# Patient Record
Sex: FEMALE | Race: WHITE | NOT HISPANIC OR LATINO | Employment: PART TIME | URBAN - METROPOLITAN AREA
[De-identification: names, ages, dates, MRNs, and addresses within clinical notes are randomized per-mention and may not be internally consistent; named-entity substitution may affect disease eponyms.]

---

## 2017-02-28 ENCOUNTER — PATIENT MESSAGE (OUTPATIENT)
Dept: FAMILY MEDICINE | Facility: CLINIC | Age: 67
End: 2017-02-28

## 2017-03-07 ENCOUNTER — TELEPHONE (OUTPATIENT)
Dept: FAMILY MEDICINE | Facility: CLINIC | Age: 67
End: 2017-03-07

## 2017-03-07 DIAGNOSIS — R73.9 HYPERGLYCEMIA: Primary | ICD-10-CM

## 2017-03-08 NOTE — TELEPHONE ENCOUNTER
I have signed for the following orders AND/OR meds.  Please call the patient and ask the patient to schedule the testing AND/OR inform about any medications that were sent.      Orders Placed This Encounter   Procedures    Glucose Tolerance 2 Hour     Standing Status:   Future     Standing Expiration Date:   5/6/2018    Hemoglobin A1c     Standing Status:   Future     Standing Expiration Date:   5/6/2018

## 2017-04-26 RX ORDER — CLOPIDOGREL BISULFATE 75 MG/1
TABLET ORAL
Qty: 90 TABLET | Refills: 1 | Status: SHIPPED | OUTPATIENT
Start: 2017-04-26 | End: 2018-03-07

## 2017-04-26 RX ORDER — CLOPIDOGREL BISULFATE 75 MG/1
TABLET ORAL
Qty: 90 TABLET | Refills: 3 | Status: SHIPPED | OUTPATIENT
Start: 2017-04-26 | End: 2017-04-26 | Stop reason: SDUPTHER

## 2017-04-26 RX ORDER — LEVOTHYROXINE SODIUM 75 UG/1
TABLET ORAL
Qty: 90 TABLET | Refills: 1 | Status: SHIPPED | OUTPATIENT
Start: 2017-04-26 | End: 2017-08-14 | Stop reason: SDUPTHER

## 2017-04-26 RX ORDER — CARVEDILOL 3.12 MG/1
TABLET ORAL
Qty: 180 TABLET | Refills: 3 | Status: SHIPPED | OUTPATIENT
Start: 2017-04-26 | End: 2017-07-31 | Stop reason: SDUPTHER

## 2017-04-26 RX ORDER — LEVOTHYROXINE SODIUM 75 UG/1
TABLET ORAL
Qty: 90 TABLET | Refills: 3 | Status: SHIPPED | OUTPATIENT
Start: 2017-04-26 | End: 2017-04-26 | Stop reason: SDUPTHER

## 2017-04-26 NOTE — TELEPHONE ENCOUNTER
Health Maintenance Due   Topic Date Due    TETANUS VACCINE  07/11/1968    Mammogram  01/02/2016    Pneumococcal (65+) (2 of 2 - PCV13) 03/23/2016    Influenza Vaccine  08/01/2016     Book a mammogram and a prevnar 13.

## 2017-05-13 ENCOUNTER — PATIENT MESSAGE (OUTPATIENT)
Dept: FAMILY MEDICINE | Facility: CLINIC | Age: 67
End: 2017-05-13

## 2017-05-15 RX ORDER — ZOLPIDEM TARTRATE 5 MG/1
5 TABLET ORAL NIGHTLY
Qty: 90 TABLET | Refills: 1 | Status: SHIPPED | OUTPATIENT
Start: 2017-05-15 | End: 2017-08-14 | Stop reason: SDUPTHER

## 2017-05-15 RX ORDER — TIZANIDINE 4 MG/1
4 TABLET ORAL EVERY 6 HOURS PRN
Qty: 90 TABLET | Refills: 2 | Status: SHIPPED | OUTPATIENT
Start: 2017-05-15 | End: 2017-08-14

## 2017-06-15 RX ORDER — ATORVASTATIN CALCIUM 40 MG/1
20 TABLET, FILM COATED ORAL DAILY
Qty: 15 TABLET | Refills: 0 | Status: SHIPPED | OUTPATIENT
Start: 2017-06-15 | End: 2018-01-22

## 2017-06-15 RX ORDER — FLUTICASONE PROPIONATE 50 MCG
2 SPRAY, SUSPENSION (ML) NASAL DAILY
Qty: 16 G | Refills: 0 | Status: SHIPPED | OUTPATIENT
Start: 2017-06-15 | End: 2018-03-07

## 2017-06-15 RX ORDER — SERTRALINE HYDROCHLORIDE 50 MG/1
50 TABLET, FILM COATED ORAL DAILY
Qty: 90 TABLET | Refills: 0 | Status: SHIPPED | OUTPATIENT
Start: 2017-06-15 | End: 2018-02-27 | Stop reason: SDUPTHER

## 2017-06-15 NOTE — TELEPHONE ENCOUNTER
I have refilled the patient's requested medication x 1 month.  However, the patient is due for an evaluation in the office.  Call the patient on the phone and book the patient with EITHER ME OR LINH OROZCO NP for a visit.    PLEASE DOCUMENT THE FACT THAT YOU HAVE CONTACTED THE PATIENT IN THE CHART FOR FUTURE REFERENCE.    Health Maintenance Due   Topic Date Due    TETANUS VACCINE  07/11/1968    Mammogram  01/05/2016    Pneumococcal (65+) (2 of 2 - PCV13) 03/23/2016

## 2017-06-19 ENCOUNTER — PATIENT MESSAGE (OUTPATIENT)
Dept: FAMILY MEDICINE | Facility: CLINIC | Age: 67
End: 2017-06-19

## 2017-06-26 ENCOUNTER — LAB VISIT (OUTPATIENT)
Dept: LAB | Facility: HOSPITAL | Age: 67
End: 2017-06-26
Attending: FAMILY MEDICINE
Payer: MEDICARE

## 2017-06-26 DIAGNOSIS — R73.9 HYPERGLYCEMIA: ICD-10-CM

## 2017-06-26 LAB
GLUCOSE SERPL-MCNC: 127 MG/DL
GLUCOSE SERPL-MCNC: 153 MG/DL
GLUCOSE SERPL-MCNC: 229 MG/DL

## 2017-06-26 PROCEDURE — 82951 GLUCOSE TOLERANCE TEST (GTT): CPT

## 2017-06-27 ENCOUNTER — TELEPHONE (OUTPATIENT)
Dept: FAMILY MEDICINE | Facility: CLINIC | Age: 67
End: 2017-06-27

## 2017-06-27 DIAGNOSIS — E11.9 TYPE 2 DIABETES MELLITUS WITHOUT COMPLICATION, UNSPECIFIED LONG TERM INSULIN USE STATUS: Primary | ICD-10-CM

## 2017-06-27 NOTE — TELEPHONE ENCOUNTER
----- Message from Mayda Villa NP sent at 6/26/2017  4:37 PM CDT -----  Hgb A1C acceptable. Continue current medications. Repeat in 6 m.

## 2017-07-01 ENCOUNTER — PATIENT MESSAGE (OUTPATIENT)
Dept: FAMILY MEDICINE | Facility: CLINIC | Age: 67
End: 2017-07-01

## 2017-07-03 RX ORDER — PANTOPRAZOLE SODIUM 40 MG/1
40 TABLET, DELAYED RELEASE ORAL DAILY
Qty: 30 TABLET | Refills: 0 | Status: SHIPPED | OUTPATIENT
Start: 2017-07-03 | End: 2017-07-29 | Stop reason: SDUPTHER

## 2017-07-09 PROBLEM — R73.9 HYPERGLYCEMIA: Status: ACTIVE | Noted: 2017-07-09

## 2017-07-09 NOTE — PROGRESS NOTES
The glucose is high fasting but the 2 hour glucose and the a1c are both in good control.  I would not call this diabetes at this point however, this is technically hyperglycemia and if nothing is done on weight loss and exercise, this usually worsens over time.  Please use a low glucose and weight loss diet and increase exercise to help lose weight.  Decrease caloric intake by at least 500 calories a day to average a weight loss of 1 # a week and recheck the fasting glucose and a1c in 6 months.

## 2017-07-31 RX ORDER — PANTOPRAZOLE SODIUM 40 MG/1
40 TABLET, DELAYED RELEASE ORAL DAILY
Qty: 30 TABLET | Refills: 11 | OUTPATIENT
Start: 2017-07-31 | End: 2017-08-02 | Stop reason: SDUPTHER

## 2017-08-02 ENCOUNTER — PATIENT MESSAGE (OUTPATIENT)
Dept: FAMILY MEDICINE | Facility: CLINIC | Age: 67
End: 2017-08-02

## 2017-08-02 RX ORDER — PANTOPRAZOLE SODIUM 40 MG/1
40 TABLET, DELAYED RELEASE ORAL DAILY
Qty: 30 TABLET | Refills: 1 | Status: SHIPPED | OUTPATIENT
Start: 2017-08-02 | End: 2017-12-13

## 2017-08-02 RX ORDER — CARVEDILOL 3.12 MG/1
3.12 TABLET ORAL 2 TIMES DAILY WITH MEALS
Qty: 180 TABLET | Refills: 3 | Status: SHIPPED | OUTPATIENT
Start: 2017-08-02 | End: 2018-02-27 | Stop reason: SDUPTHER

## 2017-08-02 RX ORDER — ERGOCALCIFEROL 1.25 MG/1
50000 CAPSULE ORAL
Qty: 24 CAPSULE | Refills: 3 | Status: SHIPPED | OUTPATIENT
Start: 2017-08-03 | End: 2018-02-27 | Stop reason: SDUPTHER

## 2017-08-02 NOTE — TELEPHONE ENCOUNTER
This patient requested medication refill and has not been seen by their PCP. Please schedule appointment with PCP.

## 2017-08-14 ENCOUNTER — OFFICE VISIT (OUTPATIENT)
Dept: FAMILY MEDICINE | Facility: CLINIC | Age: 67
End: 2017-08-14
Payer: MEDICARE

## 2017-08-14 VITALS
TEMPERATURE: 98 F | DIASTOLIC BLOOD PRESSURE: 63 MMHG | HEART RATE: 73 BPM | HEIGHT: 62 IN | SYSTOLIC BLOOD PRESSURE: 112 MMHG | WEIGHT: 183.63 LBS | BODY MASS INDEX: 33.79 KG/M2

## 2017-08-14 DIAGNOSIS — R39.15 URINARY URGENCY: ICD-10-CM

## 2017-08-14 DIAGNOSIS — F51.01 PRIMARY INSOMNIA: ICD-10-CM

## 2017-08-14 DIAGNOSIS — K21.9 GASTROESOPHAGEAL REFLUX DISEASE, ESOPHAGITIS PRESENCE NOT SPECIFIED: Primary | ICD-10-CM

## 2017-08-14 DIAGNOSIS — E11.9 TYPE 2 DIABETES MELLITUS WITHOUT COMPLICATION, UNSPECIFIED LONG TERM INSULIN USE STATUS: ICD-10-CM

## 2017-08-14 DIAGNOSIS — E03.9 HYPOTHYROIDISM, UNSPECIFIED TYPE: Chronic | ICD-10-CM

## 2017-08-14 LAB
BILIRUB UR QL STRIP: NEGATIVE
CLARITY UR: CLEAR
COLOR UR: YELLOW
GLUCOSE UR QL STRIP: NEGATIVE
HGB UR QL STRIP: ABNORMAL
KETONES UR QL STRIP: NEGATIVE
LEUKOCYTE ESTERASE UR QL STRIP: NEGATIVE
NITRITE UR QL STRIP: NEGATIVE
PH UR STRIP: 6 [PH] (ref 5–8)
PROT UR QL STRIP: NEGATIVE
SP GR UR STRIP: 1.02 (ref 1–1.03)
URN SPEC COLLECT METH UR: ABNORMAL

## 2017-08-14 PROCEDURE — 99999 PR PBB SHADOW E&M-EST. PATIENT-LVL III: CPT | Mod: PBBFAC,,, | Performed by: FAMILY MEDICINE

## 2017-08-14 PROCEDURE — 81002 URINALYSIS NONAUTO W/O SCOPE: CPT | Mod: PO

## 2017-08-14 PROCEDURE — 99213 OFFICE O/P EST LOW 20 MIN: CPT | Mod: PBBFAC,PO | Performed by: FAMILY MEDICINE

## 2017-08-14 PROCEDURE — 3044F HG A1C LEVEL LT 7.0%: CPT | Mod: ,,, | Performed by: FAMILY MEDICINE

## 2017-08-14 PROCEDURE — 3078F DIAST BP <80 MM HG: CPT | Mod: ,,, | Performed by: FAMILY MEDICINE

## 2017-08-14 PROCEDURE — 3074F SYST BP LT 130 MM HG: CPT | Mod: ,,, | Performed by: FAMILY MEDICINE

## 2017-08-14 PROCEDURE — 99214 OFFICE O/P EST MOD 30 MIN: CPT | Mod: S$PBB,,, | Performed by: FAMILY MEDICINE

## 2017-08-14 PROCEDURE — 1126F AMNT PAIN NOTED NONE PRSNT: CPT | Mod: ,,, | Performed by: FAMILY MEDICINE

## 2017-08-14 PROCEDURE — 1159F MED LIST DOCD IN RCRD: CPT | Mod: ,,, | Performed by: FAMILY MEDICINE

## 2017-08-14 RX ORDER — TIZANIDINE 4 MG/1
4 TABLET ORAL NIGHTLY PRN
COMMUNITY
End: 2018-05-14 | Stop reason: SDUPTHER

## 2017-08-14 RX ORDER — SUCRALFATE 1 G/1
1 TABLET ORAL 4 TIMES DAILY
Qty: 360 TABLET | Refills: 3 | Status: SHIPPED | OUTPATIENT
Start: 2017-08-14 | End: 2017-12-13

## 2017-08-14 RX ORDER — ZOLPIDEM TARTRATE 5 MG/1
5 TABLET ORAL NIGHTLY
Qty: 90 TABLET | Refills: 1 | Status: SHIPPED | OUTPATIENT
Start: 2017-08-14 | End: 2018-01-22 | Stop reason: SDUPTHER

## 2017-08-14 RX ORDER — LEVOTHYROXINE SODIUM 75 UG/1
TABLET ORAL
Qty: 90 TABLET | Refills: 3 | Status: SHIPPED | OUTPATIENT
Start: 2017-08-14 | End: 2018-02-27 | Stop reason: SDUPTHER

## 2017-08-14 RX ORDER — HYDROGEN PEROXIDE 3 %
20 SOLUTION, NON-ORAL MISCELLANEOUS
COMMUNITY
End: 2017-08-14

## 2017-08-14 RX ORDER — ACETAMINOPHEN 500 MG
1 TABLET ORAL NIGHTLY
COMMUNITY

## 2017-08-14 NOTE — PROGRESS NOTES
Subjective:      Patient ID: Severiano Downing is a 67 y.o. female.    Chief Complaint: Annual Exam    HPI   The patient presents with hypothyroidism.  The patient denies agitation, anxiety, blurred vision, chest pain, cold intolerance, constipation, dizziness, dry skin, fatigue, lightheadedness, paresthesias, skin coarsening, tachycardia, tremor, weight gain or weight loss.  The patient's current treatment has included Synthroid with a good response.    Lab Results   Component Value Date    TSH 3.052 06/26/2017     She has had GERD and she has had an EGD in [facility] a few years ago. She requires bid dose of protonix and she has been on qd for a while and she is having to dose with nexium to help this.  She has had carafate in the past and she is willing to get on this instead of bid dose of a PPI and she would like to see a GI to consider surgical correction of this problem.       She has had discomfort urinating and she has had a decreased urination and she has had urinary urgency.       She has chronic insomnia and her medication helps with this.  It is ambien.  She is needing a refill and we will continue this.     The patient presents with diabetes.  The patient denies polyuria, polydipsia, polyphagia, hypoglycemia and paresthesias.  The patient's glucose control has been good.  Home glucose averages are routinely checked.  The patient is without retinopathy currently.  The patient has no history of neuropathy.  The patient currently complains of no podiatric problems.  The patient has excellent compliance.  Hemoglobin A1C   Date Value Ref Range Status   06/26/2017 6.0 (H) 4.0 - 5.6 % Final     Comment:     According to ADA guidelines, hemoglobin A1c <7.0% represents  optimal control in non-pregnant diabetic patients. Different  metrics may apply to specific patient populations.   Standards of Medical Care in Diabetes-2016.  For the purpose of screening for the presence of diabetes:  <5.7%     Consistent with  the absence of diabetes  5.7-6.4%  Consistent with increasing risk for diabetes   (prediabetes)  >or=6.5%  Consistent with diabetes  Currently, no consensus exists for use of hemoglobin A1c  for diagnosis of diabetes for children.  This Hemoglobin A1c assay has significant interference with fetal   hemoglobin   (HbF). The results are invalid for patients with abnormal amounts of   HbF,   including those with known Hereditary Persistence   of Fetal Hemoglobin. Heterozygous hemoglobin variants (HbAS, HbAC,   HbAD, HbAE, HbA2) do not significantly interfere with this assay;   however, presence of multiple variants in a sample may impact the %   interference.     11/21/2016 6.1 4.5 - 6.2 % Final     Comment:     According to ADA guidelines, hemoglobin A1C <7.0% represents  optimal control in non-pregnant diabetic patients.  Different  metrics may apply to specific populations.   Standards of Medical Care in Diabetes - 2016.  For the purpose of screening for the presence of diabetes:  <5.7%     Consistent with the absence of diabetes  5.7-6.4%  Consistent with increasing risk for diabetes   (prediabetes)  >or=6.5%  Consistent with diabetes  Currently no consensus exists for use of hemoglobin A1C  for diagnosis of diabetes for children.     03/01/2016 5.9 4.5 - 6.2 % Final     No results found for: MIRIAM DE LA GARZA    Health Maintenance Due   Topic Date Due    TETANUS VACCINE  07[birth year]11/1968    Mammogram  01/05/2016    Pneumococcal (65+) (2 of 2 - PCV13) 03/23/2016    Influenza Vaccine  08/01/2017       Past Medical History:  Past Medical History:   Diagnosis Date    Anxiety state 9/28/2010    CAD (coronary artery disease)     Depression     Dyslipidemia     GERD (gastroesophageal reflux disease)     Hypothyroidism 8/17/2012    Osteopenia 2013    repeat dexa in 2015    S/P coronary artery stent placement     Thyroid disease     Vitamin D deficiency      Past Surgical History:   Procedure Laterality Date     CHOLECYSTECTOMY      COLONOSCOPY      MINI-LAPAROTOMY      unilateral oopherectomy with salpingectomy for ECTOPIC pregnancy     Review of patient's allergies indicates:   Allergen Reactions    Avelox [moxifloxacin] Other (See Comments)    Codeine Other (See Comments)    Doxycycline Other (See Comments)    Sulfa (sulfonamide antibiotics) Other (See Comments)     Current Outpatient Prescriptions on File Prior to Visit   Medication Sig Dispense Refill    atorvastatin (LIPITOR) 40 MG tablet Take 0.5 tablets (20 mg total) by mouth once daily. 15 tablet 0    carvedilol (COREG) 3.125 MG tablet Take 1 tablet (3.125 mg total) by mouth 2 (two) times daily with meals. 180 tablet 3    cetirizine (ZYRTEC) 10 MG tablet Take 5 mg by mouth once daily.       clopidogrel (PLAVIX) 75 mg tablet TAKE 1 TABLET ONE TIME DAILY 90 tablet 1    ergocalciferol (VITAMIN D2) 50,000 unit Cap Take 1 capsule (50,000 Units total) by mouth twice a week. 24 capsule 3    fluticasone (FLONASE) 50 mcg/actuation nasal spray 2 sprays by Each Nare route once daily. 16 g 0    multivitamin capsule Take 1 capsule by mouth once daily.        pantoprazole (PROTONIX) 40 MG tablet TAKE 1 TABLET (40 MG TOTAL) BY MOUTH ONCE DAILY. 30 tablet 1    sertraline (ZOLOFT) 50 MG tablet Take 1 tablet (50 mg total) by mouth once daily. 90 tablet 0     No current facility-administered medications on file prior to visit.      Social History     Social History    Marital status:      Spouse name: N/A    Number of children: N/A    Years of education: N/A     Occupational History    Not on file.     Social History Main Topics    Smoking status: Never Smoker    Smokeless tobacco: Not on file    Alcohol use Yes    Drug use: No    Sexual activity: No     Other Topics Concern    Not on file     Social History Narrative    No narrative on file     Family History   Problem Relation Age of Onset    Cancer Mother     Heart disease Mother      "Hyperlipidemia Mother     Hypertension Mother     Cancer Father     Heart disease Father     Hyperlipidemia Father     Hypertension Father     Cancer Sister            Review of Systems   Constitutional: Negative for fatigue, fever and unexpected weight change.   HENT: Negative for congestion, ear pain, postnasal drip and sore throat.    Eyes: Negative for visual disturbance.   Respiratory: Negative for cough, chest tightness, shortness of breath and wheezing.    Cardiovascular: Negative for chest pain, palpitations and leg swelling.   Gastrointestinal: Negative for abdominal pain, blood in stool, constipation, diarrhea, nausea and vomiting.        She is having GERD.   Genitourinary: Positive for dysuria and urgency. Negative for hematuria.   Neurological: Negative for weakness and numbness.       Objective:   /63   Pulse 73   Temp 98.3 °F (36.8 °C) (Oral)   Ht 5' 2" (1.575 m)   Wt 83.3 kg (183 lb 9.6 oz)   BMI 33.58 kg/m²     Physical Exam   Constitutional: She appears well-developed and well-nourished. She is cooperative.   HENT:   Head: Normocephalic and atraumatic.   Right Ear: Tympanic membrane, external ear and ear canal normal.   Left Ear: Tympanic membrane, external ear and ear canal normal.   Nose: Nose normal.   Mouth/Throat: Uvula is midline and mucous membranes are normal. No oral lesions. No oropharyngeal exudate, posterior oropharyngeal edema or posterior oropharyngeal erythema.   Eyes: EOM and lids are normal. Pupils are equal, round, and reactive to light. Right eye exhibits no discharge. Left eye exhibits no discharge. Right conjunctiva is not injected. Right conjunctiva has no hemorrhage. Left conjunctiva is not injected. Left conjunctiva has no hemorrhage. No scleral icterus. Right eye exhibits no nystagmus. Left eye exhibits no nystagmus.   Neck: Normal range of motion and full passive range of motion without pain. Neck supple. No JVD present. No tracheal tenderness present. " Carotid bruit is not present. No tracheal deviation present. No thyroid mass and no thyromegaly present.   Cardiovascular: Normal rate, regular rhythm, S1 normal and S2 normal.    No murmur heard.  Pulses:       Carotid pulses are 2+ on the right side, and 2+ on the left side.       Radial pulses are 2+ on the right side, and 2+ on the left side.        Posterior tibial pulses are 2+ on the right side, and 2+ on the left side.   Pulmonary/Chest: Effort normal and breath sounds normal. No respiratory distress. She has no wheezes. She has no rhonchi. She has no rales.   Abdominal: Soft. Normal appearance, normal aorta and bowel sounds are normal. She exhibits no distension, no abdominal bruit, no pulsatile midline mass and no mass. There is no hepatosplenomegaly. There is no tenderness. There is no rebound.   Musculoskeletal:        Right knee: She exhibits no swelling. No tenderness found.        Left knee: She exhibits no swelling. No tenderness found.   Lymphadenopathy:        Head (right side): No submental and no submandibular adenopathy present.        Head (left side): No submental and no submandibular adenopathy present.     She has no cervical adenopathy.   Neurological: She is alert. She has normal strength. No cranial nerve deficit or sensory deficit.   Skin: Skin is warm and dry. No rash noted. No cyanosis. Nails show no clubbing.   Psychiatric: She has a normal mood and affect. Her speech is normal and behavior is normal. Thought content normal. Cognition and memory are normal.       Assessment:     1. Gastroesophageal reflux disease, esophagitis presence not specified    2. Hypothyroidism, unspecified type    3. Urinary urgency    4. Primary insomnia    5. Type 2 diabetes mellitus without complication, unspecified long term insulin use status        Plan:   Severiano was seen today for annual exam.    Diagnoses and all orders for this visit:    Gastroesophageal reflux disease, esophagitis presence not  specified  -     Ambulatory referral to Gastroenterology    Hypothyroidism, unspecified type    Urinary urgency  -     Urinalysis; Future  -     Urinalysis    Primary insomnia  -     zolpidem (AMBIEN) 5 MG Tab; Take 1 tablet (5 mg total) by mouth every evening.    Type 2 diabetes mellitus without complication, unspecified long term insulin use status  -     Hemoglobin A1c    Other orders  -     levothyroxine (SYNTHROID) 75 MCG tablet; TAKE 1 TABLET ONE TIME DAILY  -     sucralfate (CARAFATE) 1 gram tablet; Take 1 tablet (1 g total) by mouth 4 (four) times daily.      Cont meds.

## 2017-10-05 RX ORDER — PANTOPRAZOLE SODIUM 40 MG/1
TABLET, DELAYED RELEASE ORAL
Qty: 30 TABLET | Refills: 1 | OUTPATIENT
Start: 2017-10-05

## 2017-10-08 DIAGNOSIS — Z12.31 ENCOUNTER FOR SCREENING MAMMOGRAM FOR BREAST CANCER: Primary | ICD-10-CM

## 2017-10-13 ENCOUNTER — PATIENT MESSAGE (OUTPATIENT)
Dept: FAMILY MEDICINE | Facility: CLINIC | Age: 67
End: 2017-10-13

## 2017-10-24 ENCOUNTER — TELEPHONE (OUTPATIENT)
Dept: GASTROENTEROLOGY | Facility: CLINIC | Age: 67
End: 2017-10-24

## 2017-10-24 NOTE — TELEPHONE ENCOUNTER
Called pt to schedule for EGD/Colonoscopy.  During conversation pt mentioned that she has been checking her pulse Ox the past week with her husbands(who has COPD)monitor  It has been reading normally for him  But her reading has been only from 90-94 %.  Plus she has noticed that she has been getting SOB easily for the past 2 months    Please advise      Plus we needed to know if she could hold her Plavix for 3-5 days prior to procedures

## 2017-10-25 NOTE — TELEPHONE ENCOUNTER
Discussed recommendations with pt.  She will see her PCP before procedures which are schedule 12/15/17

## 2017-10-26 ENCOUNTER — TELEPHONE (OUTPATIENT)
Dept: FAMILY MEDICINE | Facility: CLINIC | Age: 67
End: 2017-10-26

## 2017-10-26 NOTE — TELEPHONE ENCOUNTER
----- Message from Vianey Stein sent at 10/25/2017  4:02 PM CDT -----  Contact: patient came to my desk  Patient states that she is having a GI procedure done in December, when the nurse called her to set this up she mentioned that her b/p and pulse ox levels have been low.    She states that the nurse instructed her to let her PCP know this information as well as to make an appointment to have this checked.    I made this patient a nurse appointment to have this checked and put in the notes to make sure that Dr. Belcher is made aware of the results. The patient is concerned that she may not be able to have her procedure done if the results are abnormal and wants Dr. Belcher aware of the situation.

## 2017-10-27 ENCOUNTER — CLINICAL SUPPORT (OUTPATIENT)
Dept: FAMILY MEDICINE | Facility: CLINIC | Age: 67
End: 2017-10-27
Payer: MEDICARE

## 2017-10-27 VITALS — DIASTOLIC BLOOD PRESSURE: 67 MMHG | SYSTOLIC BLOOD PRESSURE: 105 MMHG | HEART RATE: 79 BPM

## 2017-10-27 DIAGNOSIS — I10 ESSENTIAL HYPERTENSION: Primary | ICD-10-CM

## 2017-10-27 PROCEDURE — 99499 UNLISTED E&M SERVICE: CPT | Mod: S$PBB,,, | Performed by: FAMILY MEDICINE

## 2017-10-27 PROCEDURE — 99213 OFFICE O/P EST LOW 20 MIN: CPT | Mod: PBBFAC,PO

## 2017-10-27 PROCEDURE — 99999 PR PBB SHADOW E&M-EST. PATIENT-LVL III: CPT | Mod: PBBFAC,,,

## 2017-11-30 ENCOUNTER — TELEPHONE (OUTPATIENT)
Dept: FAMILY MEDICINE | Facility: CLINIC | Age: 67
End: 2017-11-30

## 2017-11-30 NOTE — TELEPHONE ENCOUNTER
----- Message from Jamia Card sent at 11/30/2017  2:20 PM CST -----  Contact: pt  Pt is calling nurse staff regarding orders for a mammogram.  Pt call back [phone] thanks

## 2017-12-01 ENCOUNTER — HOSPITAL ENCOUNTER (OUTPATIENT)
Dept: RADIOLOGY | Facility: HOSPITAL | Age: 67
Discharge: HOME | End: 2017-12-01
Attending: FAMILY MEDICINE
Payer: MEDICARE

## 2017-12-01 VITALS — WEIGHT: 183 LBS | HEIGHT: 62 IN | BODY MASS INDEX: 33.68 KG/M2

## 2017-12-01 DIAGNOSIS — Z12.31 ENCOUNTER FOR SCREENING MAMMOGRAM FOR BREAST CANCER: ICD-10-CM

## 2017-12-01 PROCEDURE — 77067 SCR MAMMO BI INCL CAD: CPT | Mod: TC,PO

## 2017-12-01 PROCEDURE — 77063 BREAST TOMOSYNTHESIS BI: CPT | Mod: 26,,, | Performed by: RADIOLOGY

## 2017-12-01 PROCEDURE — 77067 SCR MAMMO BI INCL CAD: CPT | Mod: 26,,, | Performed by: RADIOLOGY

## 2017-12-14 ENCOUNTER — TELEPHONE (OUTPATIENT)
Dept: GASTROENTEROLOGY | Facility: CLINIC | Age: 67
End: 2017-12-14

## 2017-12-14 NOTE — TELEPHONE ENCOUNTER
----- Message from Gilda Pro sent at 12/14/2017 11:08 AM CST -----  Contact: self  Patient would like to cancel her colonoscopy and endoscope for 12/15/17. Patient's  is very ill and does not want to reschedule right now. Please call patient at [phone] to confirm it was canceled. Thanks!

## 2018-01-10 ENCOUNTER — TELEPHONE (OUTPATIENT)
Dept: FAMILY MEDICINE | Facility: CLINIC | Age: 68
End: 2018-01-10

## 2018-01-10 RX ORDER — CIPROFLOXACIN 500 MG/1
500 TABLET ORAL 2 TIMES DAILY
Qty: 14 TABLET | Refills: 0 | Status: SHIPPED | OUTPATIENT
Start: 2018-01-10 | End: 2018-01-17

## 2018-01-10 RX ORDER — PHENAZOPYRIDINE HYDROCHLORIDE 200 MG/1
200 TABLET, FILM COATED ORAL 2 TIMES DAILY PRN
Qty: 6 TABLET | Refills: 0 | Status: SHIPPED | OUTPATIENT
Start: 2018-01-10 | End: 2018-03-07

## 2018-01-10 NOTE — TELEPHONE ENCOUNTER
She has already taken cipro and she is having no problems on this so she would like to get this filled to have a full prescription.

## 2018-01-10 NOTE — TELEPHONE ENCOUNTER
----- Message from Marlyn Pranav sent at 1/10/2018  4:03 PM CST -----  Patient state she have a UTI and requesting a Rx for Cipro called in. Patient state she is unable to come in for an appt because her  passed away on Monday.     Pt use..    [id number] - [place] - [address] [address]  Phone: [phone] Fax: [phone]    Please adv/call [phone].//thanks. cw

## 2018-01-22 ENCOUNTER — TELEPHONE (OUTPATIENT)
Dept: FAMILY MEDICINE | Facility: CLINIC | Age: 68
End: 2018-01-22

## 2018-01-22 ENCOUNTER — OFFICE VISIT (OUTPATIENT)
Dept: FAMILY MEDICINE | Facility: CLINIC | Age: 68
End: 2018-01-22
Payer: MEDICARE

## 2018-01-22 ENCOUNTER — LAB VISIT (OUTPATIENT)
Dept: LAB | Facility: HOSPITAL | Age: 68
End: 2018-01-22
Attending: NURSE PRACTITIONER
Payer: MEDICARE

## 2018-01-22 VITALS
DIASTOLIC BLOOD PRESSURE: 61 MMHG | BODY MASS INDEX: 34.61 KG/M2 | SYSTOLIC BLOOD PRESSURE: 96 MMHG | HEIGHT: 62 IN | HEART RATE: 68 BPM | TEMPERATURE: 98 F | WEIGHT: 188.06 LBS

## 2018-01-22 DIAGNOSIS — R42 DIZZINESS: ICD-10-CM

## 2018-01-22 DIAGNOSIS — R30.0 DYSURIA: Primary | ICD-10-CM

## 2018-01-22 DIAGNOSIS — F51.01 PRIMARY INSOMNIA: ICD-10-CM

## 2018-01-22 LAB
BACTERIA #/AREA URNS HPF: ABNORMAL /HPF
BASOPHILS # BLD AUTO: 0.05 K/UL
BASOPHILS NFR BLD: 0.7 %
BILIRUB UR QL STRIP: ABNORMAL
CAOX CRY URNS QL MICRO: ABNORMAL
CLARITY UR: CLEAR
COLOR UR: ABNORMAL
DIFFERENTIAL METHOD: NORMAL
EOSINOPHIL # BLD AUTO: 0.3 K/UL
EOSINOPHIL NFR BLD: 3.5 %
ERYTHROCYTE [DISTWIDTH] IN BLOOD BY AUTOMATED COUNT: 14 %
GLUCOSE UR QL STRIP: ABNORMAL
HCT VFR BLD AUTO: 38 %
HGB BLD-MCNC: 12.5 G/DL
HGB UR QL STRIP: ABNORMAL
IMM GRANULOCYTES # BLD AUTO: 0.02 K/UL
IMM GRANULOCYTES NFR BLD AUTO: 0.3 %
KETONES UR QL STRIP: ABNORMAL
LEUKOCYTE ESTERASE UR QL STRIP: ABNORMAL
LYMPHOCYTES # BLD AUTO: 1.8 K/UL
LYMPHOCYTES NFR BLD: 24.8 %
MCH RBC QN AUTO: 29.4 PG
MCHC RBC AUTO-ENTMCNC: 32.9 G/DL
MCV RBC AUTO: 89 FL
MICROSCOPIC COMMENT: ABNORMAL
MONOCYTES # BLD AUTO: 0.6 K/UL
MONOCYTES NFR BLD: 9 %
NEUTROPHILS # BLD AUTO: 4.4 K/UL
NEUTROPHILS NFR BLD: 61.7 %
NITRITE UR QL STRIP: ABNORMAL
NRBC BLD-RTO: 0 /100 WBC
PH UR STRIP: ABNORMAL [PH] (ref 5–8)
PLATELET # BLD AUTO: 277 K/UL
PMV BLD AUTO: 11.7 FL
PROT UR QL STRIP: ABNORMAL
RBC # BLD AUTO: 4.25 M/UL
RBC #/AREA URNS HPF: 3 /HPF (ref 0–4)
SP GR UR STRIP: ABNORMAL (ref 1–1.03)
SQUAMOUS #/AREA URNS HPF: 5 /HPF
URN SPEC COLLECT METH UR: ABNORMAL
WBC # BLD AUTO: 7.14 K/UL
WBC #/AREA URNS HPF: 2 /HPF (ref 0–5)

## 2018-01-22 PROCEDURE — 99213 OFFICE O/P EST LOW 20 MIN: CPT | Mod: PBBFAC,PO | Performed by: NURSE PRACTITIONER

## 2018-01-22 PROCEDURE — 99999 PR PBB SHADOW E&M-EST. PATIENT-LVL III: CPT | Mod: PBBFAC,,, | Performed by: NURSE PRACTITIONER

## 2018-01-22 PROCEDURE — 87086 URINE CULTURE/COLONY COUNT: CPT

## 2018-01-22 PROCEDURE — 80053 COMPREHEN METABOLIC PANEL: CPT

## 2018-01-22 PROCEDURE — 99213 OFFICE O/P EST LOW 20 MIN: CPT | Mod: S$PBB,,, | Performed by: NURSE PRACTITIONER

## 2018-01-22 PROCEDURE — 85025 COMPLETE CBC W/AUTO DIFF WBC: CPT

## 2018-01-22 PROCEDURE — 36415 COLL VENOUS BLD VENIPUNCTURE: CPT | Mod: PO

## 2018-01-22 PROCEDURE — 81000 URINALYSIS NONAUTO W/SCOPE: CPT | Mod: PO

## 2018-01-22 RX ORDER — ZOLPIDEM TARTRATE 5 MG/1
5 TABLET ORAL NIGHTLY
Qty: 90 TABLET | Refills: 0 | Status: SHIPPED | OUTPATIENT
Start: 2018-01-22 | End: 2018-06-20 | Stop reason: SDUPTHER

## 2018-01-22 NOTE — PROGRESS NOTES
Subjective:      Patient ID: Severiano Downing is a 67 y.o. female.    Chief Complaint: No chief complaint on file.    Dysuria    This is a new problem. The current episode started 1 to 4 weeks ago. The problem has been gradually worsening. The quality of the pain is described as burning. The pain is moderate. Maximum temperature: Reports low grade temp. She is not sexually active. There is no history of pyelonephritis. Associated symptoms include flank pain, frequency, hematuria and urgency. Pertinent negatives include no chills, hesitancy, nausea, sweats, constipation or rash. She has tried antibiotics (inconsistent antibiotic usage and pyridium) for the symptoms. The treatment provided mild relief.   She also reports she is experiencing intermittent dizziness, worsened with movement.  Onset after starting Pyridium.  She denies URI symptoms.  Denies loss of consciousness and falls.  She has not taken anything for this and cannot identify any other exacerbating or mitigating factors.   Patient also requesting refill of Ambien which she takes for insomnia.  She reports she does not take this every night but she has been under increased stress over the last month with her 's death and moving.  She reports she has difficulty falling asleep and staying asleep if she does not take it.  With the medication she is able to fall asleep and stay asleep.  She has tried Melatonin and it works well when she is not under a lot of stress.    Review of Systems   Constitutional: Negative for chills, fatigue and fever.   HENT:        Dry mouth   Respiratory: Negative for cough, shortness of breath and wheezing.    Cardiovascular: Negative for chest pain, palpitations and leg swelling.   Gastrointestinal: Positive for abdominal pain (low abdominal pressure ). Negative for abdominal distention, constipation and nausea.   Genitourinary: Positive for dysuria, flank pain, frequency, hematuria and urgency. Negative for difficulty  "urinating and hesitancy.   Skin: Negative for rash and wound.   Neurological: Positive for dizziness. Negative for weakness and numbness.   Psychiatric/Behavioral: The patient is not nervous/anxious.        Objective:     BP 96/61   Pulse 68   Temp 97.5 °F (36.4 °C) (Oral)   Ht 5' 2" (1.575 m)   Wt 85.3 kg (188 lb 0.8 oz)   BMI 34.40 kg/m²     Physical Exam   Constitutional: She is oriented to person, place, and time. She appears well-developed and well-nourished.   HENT:   Head: Normocephalic.   Eyes: Pupils are equal, round, and reactive to light.   Cardiovascular: Normal rate, regular rhythm and normal heart sounds.    Pulmonary/Chest: Effort normal and breath sounds normal. No respiratory distress. She has no decreased breath sounds. She has no wheezes. She has no rhonchi. She has no rales.   Abdominal: Soft. Bowel sounds are normal. She exhibits no distension and no mass. There is generalized tenderness. There is no rigidity, no rebound, no guarding and no CVA tenderness.   Musculoskeletal: Normal range of motion.   Lymphadenopathy:     She has no cervical adenopathy.   Neurological: She is alert and oriented to person, place, and time.   Skin: Skin is warm and dry. No rash noted.   Psychiatric: She has a normal mood and affect. Her behavior is normal. Judgment and thought content normal.   Vitals reviewed.  Orthostatics: lying 105/63 hr 69; standing 99/59 hr 72    Assessment:     1. Dysuria    2. Dizziness    3. Primary insomnia        Plan:     Problem List Items Addressed This Visit        Other    Dizziness    Relevant Orders    CBC auto differential    Comprehensive metabolic panel    Primary insomnia    Relevant Medications    zolpidem (AMBIEN) 5 MG Tab      Other Visit Diagnoses     Dysuria    -  Primary    Relevant Orders    Urinalysis (Completed)    Urine culture    Urinalysis      Change positions slowly  Hydrate well  Discussed dizziness could possibly be a side effect from pyridium and " encouraged her to d/c it for now  RTC for repeat U/A next week  Symptomatic care discussed and educational handout provided  Report to ER if symptoms worsen    Follow-up if symptoms worsen or fail to improve.

## 2018-01-22 NOTE — TELEPHONE ENCOUNTER
----- Message from Vane Navarrete sent at 1/22/2018 12:22 PM CST -----  Pt at [phone]//states she is still having symptoms of a UTI infection and she is also having some dizziness////would like an appt today if possible//if not able to be seen today//she is needing a refill of med//Ambien//[facility] [place] [place]please call//angela/lh

## 2018-01-22 NOTE — PATIENT INSTRUCTIONS
"Hydrate    Dysuria     Painful urination (dysuria) is often caused by a problem in the urinary tract.   Dysuria is pain felt during urination. It is often described as a burning. Learn more about this problem and how it can be treated.  What causes dysuria?  Possible causes include:  · Infection with a bacteria or virus such as a urinary tract infection (UTI or a sexually transmitted infection (STI)  · Sensitivity or allergy to chemicals such as those found in lotions and other products  · Prostate or bladder problems  · Radiation therapy to the pelvic area  How is dysuria diagnosed?  Your healthcare provider will examine you. He or she will ask about your symptoms and health. After talking with you and doing a physical exam, your healthcare provider may know what is causing your dysuria. He or she will usually request  a sample of your urine. Tests of your urine, or a "urinalysis," are done. A urinalysis may include:  · Looking at the urine sample (visual exam)  · Checking for substances (chemical exam)  · Looking at a small amount under a microscope (microscopic exam)  Some parts of the urinalysis may be done in the provider's office and some in a lab. And, the urine sample may be checked for bacteria and yeast (urine culture). Your healthcare provider will tell you more about these tests if they are needed.  How is dysuria treated?  Treatment depends on the cause. If you have a bacterial infection, you may need antibiotics. You may be given medicines to make it easier for you to urinate and help relieve pain. Your healthcare provider can tell you more about your treatment options. Untreated, symptoms may get worse.  When to call your healthcare provider  Call the healthcare provider right away if you have any of the following:  · Fever of 100.4°F (38°C) or higher   · No improvement after three days of treatment  · Trouble urinating because of pain  · New or increased discharge from the vagina or penis  · Rash or " joint pain  · Increased back or abdominal pain  · Enlarged painful lymph nodes (lumps) in the groin   Date Last Reviewed: 1/1/2017  © 6295-0060 [employer]. [address] [place] [address], [place] [address]. All rights reserved. This information is not intended as a substitute for professional medical care. Always follow your healthcare professional's instructions.

## 2018-01-23 DIAGNOSIS — R30.0 DYSURIA: Primary | ICD-10-CM

## 2018-01-23 LAB
ALBUMIN SERPL BCP-MCNC: 3.5 G/DL
ALP SERPL-CCNC: 107 U/L
ALT SERPL W/O P-5'-P-CCNC: 15 U/L
ANION GAP SERPL CALC-SCNC: 10 MMOL/L
AST SERPL-CCNC: 19 U/L
BILIRUB SERPL-MCNC: 0.5 MG/DL
BUN SERPL-MCNC: 14 MG/DL
CALCIUM SERPL-MCNC: 8.8 MG/DL
CHLORIDE SERPL-SCNC: 107 MMOL/L
CO2 SERPL-SCNC: 21 MMOL/L
CREAT SERPL-MCNC: 0.8 MG/DL
EST. GFR  (AFRICAN AMERICAN): >60 ML/MIN/1.73 M^2
EST. GFR  (NON AFRICAN AMERICAN): >60 ML/MIN/1.73 M^2
GLUCOSE SERPL-MCNC: 108 MG/DL
POTASSIUM SERPL-SCNC: 3.9 MMOL/L
PROT SERPL-MCNC: 6.3 G/DL
SODIUM SERPL-SCNC: 138 MMOL/L

## 2018-01-24 LAB — BACTERIA UR CULT: NORMAL

## 2018-02-26 ENCOUNTER — PATIENT MESSAGE (OUTPATIENT)
Dept: CARDIOLOGY | Facility: CLINIC | Age: 68
End: 2018-02-26

## 2018-02-27 RX ORDER — LEVOTHYROXINE SODIUM 75 UG/1
TABLET ORAL
Qty: 90 TABLET | Refills: 1 | Status: SHIPPED | OUTPATIENT
Start: 2018-02-27 | End: 2018-08-11 | Stop reason: SDUPTHER

## 2018-02-27 RX ORDER — CARVEDILOL 3.12 MG/1
3.12 TABLET ORAL 2 TIMES DAILY WITH MEALS
Qty: 180 TABLET | Refills: 1 | Status: SHIPPED | OUTPATIENT
Start: 2018-02-27 | End: 2018-08-24 | Stop reason: SDUPTHER

## 2018-02-27 RX ORDER — ERGOCALCIFEROL 1.25 MG/1
50000 CAPSULE ORAL
Qty: 24 CAPSULE | Refills: 1 | Status: SHIPPED | OUTPATIENT
Start: 2018-03-01 | End: 2018-07-30 | Stop reason: SDUPTHER

## 2018-02-27 RX ORDER — SERTRALINE HYDROCHLORIDE 50 MG/1
50 TABLET, FILM COATED ORAL DAILY
Qty: 90 TABLET | Refills: 1 | Status: SHIPPED | OUTPATIENT
Start: 2018-02-27 | End: 2018-11-23 | Stop reason: SDUPTHER

## 2018-03-03 ENCOUNTER — PATIENT MESSAGE (OUTPATIENT)
Dept: FAMILY MEDICINE | Facility: CLINIC | Age: 68
End: 2018-03-03

## 2018-03-03 ENCOUNTER — PATIENT MESSAGE (OUTPATIENT)
Dept: CARDIOLOGY | Facility: CLINIC | Age: 68
End: 2018-03-03

## 2018-03-03 DIAGNOSIS — R19.8 CHANGE IN BOWEL FUNCTION: Primary | ICD-10-CM

## 2018-03-05 ENCOUNTER — TELEPHONE (OUTPATIENT)
Dept: GASTROENTEROLOGY | Facility: CLINIC | Age: 68
End: 2018-03-05

## 2018-03-05 ENCOUNTER — TELEPHONE (OUTPATIENT)
Dept: FAMILY MEDICINE | Facility: CLINIC | Age: 68
End: 2018-03-05

## 2018-03-05 ENCOUNTER — DOCUMENTATION ONLY (OUTPATIENT)
Dept: ENDOSCOPY | Facility: HOSPITAL | Age: 68
End: 2018-03-05

## 2018-03-05 PROBLEM — R19.8 CHANGE IN BOWEL FUNCTION: Status: ACTIVE | Noted: 2018-03-05

## 2018-03-05 RX ORDER — SODIUM, POTASSIUM,MAG SULFATES 17.5-3.13G
SOLUTION, RECONSTITUTED, ORAL ORAL
Qty: 354 ML | Refills: 0 | Status: SHIPPED | OUTPATIENT
Start: 2018-03-05 | End: 2018-05-04

## 2018-03-05 RX ORDER — PROMETHAZINE HYDROCHLORIDE 25 MG/1
25 TABLET ORAL EVERY 6 HOURS PRN
Qty: 6 TABLET | Refills: 0 | Status: SHIPPED | OUTPATIENT
Start: 2018-03-05 | End: 2018-04-03 | Stop reason: ALTCHOICE

## 2018-03-05 NOTE — TELEPHONE ENCOUNTER
----- Message from Phyllis Cardoso sent at 3/5/2018  3:54 PM CST -----  Please call patient to schedule colonoscopy, [phone] (home)

## 2018-03-05 NOTE — TELEPHONE ENCOUNTER
I have signed for the following orders AND/OR meds.  Please call the patient and ask the patient to schedule the testing AND/OR inform about any medications that were sent.      Orders Placed This Encounter   Procedures    Case request GI: COLONOSCOPY     Order Specific Question:   Pre-op Diagnosis     Answer:   Change in bowel function [394728]     Order Specific Question:   CPT Code:     Answer:   [facility] COLONOSCOPY,DIAGNOSTIC [30625]     Order Specific Question:   Case Referring Provider     Answer:   JAMES GALLOWAY [[id number]]         Medications Ordered This Encounter      promethazine (PHENERGAN) 25 MG tablet          Sig: Take 1 tablet (25 mg total) by mouth every 6 (six) hours as needed for Nausea.          Dispense:  6 tablet          Refill:  0      sodium,potassium,mag sulfates (SUPREP BOWEL PREP KIT) 17.5-3.13-1.6 gram SolR          Sig: Take as instructed on prep sheet          Dispense:  354 mL          Refill:  0

## 2018-03-06 NOTE — TELEPHONE ENCOUNTER
Contacted patient about her March 7, 2018 at 9am instead of 11:00am.  Patient agrees and understand.

## 2018-03-06 NOTE — TELEPHONE ENCOUNTER
Spoke to pt  colonoscopy scheduled for 4/4/18      Instructed pt to call herself to cancel colonoscopy she scheduled with Dr Eason

## 2018-03-07 ENCOUNTER — OFFICE VISIT (OUTPATIENT)
Dept: CARDIOLOGY | Facility: CLINIC | Age: 68
End: 2018-03-07
Payer: MEDICARE

## 2018-03-07 VITALS
BODY MASS INDEX: 35.13 KG/M2 | SYSTOLIC BLOOD PRESSURE: 122 MMHG | WEIGHT: 186.06 LBS | HEART RATE: 72 BPM | DIASTOLIC BLOOD PRESSURE: 73 MMHG | HEIGHT: 61 IN

## 2018-03-07 DIAGNOSIS — R00.2 PALPITATIONS: ICD-10-CM

## 2018-03-07 DIAGNOSIS — E78.5 DYSLIPIDEMIA: ICD-10-CM

## 2018-03-07 DIAGNOSIS — Z95.5 S/P CORONARY ARTERY STENT PLACEMENT: ICD-10-CM

## 2018-03-07 DIAGNOSIS — I25.10 CORONARY ARTERY DISEASE INVOLVING NATIVE CORONARY ARTERY OF NATIVE HEART WITHOUT ANGINA PECTORIS: Primary | ICD-10-CM

## 2018-03-07 DIAGNOSIS — I10 ESSENTIAL HYPERTENSION: ICD-10-CM

## 2018-03-07 PROCEDURE — 99999 PR PBB SHADOW E&M-EST. PATIENT-LVL II: CPT | Mod: PBBFAC,,, | Performed by: INTERNAL MEDICINE

## 2018-03-07 PROCEDURE — 99212 OFFICE O/P EST SF 10 MIN: CPT | Mod: PBBFAC,PO | Performed by: INTERNAL MEDICINE

## 2018-03-07 PROCEDURE — 99214 OFFICE O/P EST MOD 30 MIN: CPT | Mod: S$PBB,,, | Performed by: INTERNAL MEDICINE

## 2018-03-07 RX ORDER — CLOPIDOGREL BISULFATE 75 MG/1
TABLET ORAL
Qty: 90 TABLET | Refills: 3 | Status: SHIPPED | OUTPATIENT
Start: 2018-03-07 | End: 2019-01-18 | Stop reason: SDUPTHER

## 2018-03-07 RX ORDER — ATORVASTATIN CALCIUM 10 MG/1
10 TABLET, FILM COATED ORAL DAILY
Qty: 90 TABLET | Refills: 3 | Status: SHIPPED | OUTPATIENT
Start: 2018-03-07 | End: 2019-03-16 | Stop reason: SDUPTHER

## 2018-03-07 NOTE — PROGRESS NOTES
Subjective:    Patient ID:  Severiano Downing is a 67 y.o. female who presents for follow-up of cad    HPI  She comes with no complaints, no chest pain, no shortness of breath      Review of Systems   Constitution: Negative for decreased appetite, weakness, malaise/fatigue, weight gain and weight loss.   Cardiovascular: Negative for chest pain, dyspnea on exertion, leg swelling, palpitations and syncope.   Respiratory: Negative for cough and shortness of breath.    Gastrointestinal: Negative.    All other systems reviewed and are negative.       Objective:    Physical Exam   Constitutional: She is oriented to person, place, and time. She appears well-developed and well-nourished.   HENT:   Head: Normocephalic.   Eyes: Pupils are equal, round, and reactive to light.   Neck: Normal range of motion. Neck supple. No JVD present. Carotid bruit is not present. No thyromegaly present.   Cardiovascular: Normal rate, regular rhythm, normal heart sounds, intact distal pulses and normal pulses.  PMI is not displaced.  Exam reveals no gallop.    No murmur heard.  Pulmonary/Chest: Effort normal and breath sounds normal.   Abdominal: Soft. Normal appearance. She exhibits no mass. There is no hepatosplenomegaly. There is no tenderness.   Musculoskeletal: Normal range of motion. She exhibits no edema.   Neurological: She is alert and oriented to person, place, and time. She has normal strength and normal reflexes. No sensory deficit.   Skin: Skin is warm and intact.   Psychiatric: She has a normal mood and affect.   Nursing note and vitals reviewed.        Assessment:       1. Coronary artery disease involving native coronary artery of native heart without angina pectoris    2. S/P coronary artery stent placement    3. Dyslipidemia    4. Essential hypertension    5. Palpitations         Plan:   Resume plavix, lipitor  Continue all cardiac medications  Regular exercise program  Weight loss  1 yr f/u with labs, ccfd

## 2018-03-13 ENCOUNTER — TELEPHONE (OUTPATIENT)
Dept: GASTROENTEROLOGY | Facility: CLINIC | Age: 68
End: 2018-03-13

## 2018-04-03 ENCOUNTER — ANESTHESIA EVENT (OUTPATIENT)
Dept: ENDOSCOPY | Facility: HOSPITAL | Age: 68
End: 2018-04-03
Payer: MEDICARE

## 2018-04-04 ENCOUNTER — HOSPITAL ENCOUNTER (OUTPATIENT)
Facility: HOSPITAL | Age: 68
Discharge: HOME | End: 2018-04-04
Attending: INTERNAL MEDICINE | Admitting: INTERNAL MEDICINE
Payer: MEDICARE

## 2018-04-04 ENCOUNTER — ANESTHESIA (OUTPATIENT)
Dept: ENDOSCOPY | Facility: HOSPITAL | Age: 68
End: 2018-04-04
Payer: MEDICARE

## 2018-04-04 ENCOUNTER — SURGERY (OUTPATIENT)
Age: 68
End: 2018-04-04

## 2018-04-04 VITALS
BODY MASS INDEX: 33.13 KG/M2 | DIASTOLIC BLOOD PRESSURE: 68 MMHG | WEIGHT: 180 LBS | HEART RATE: 70 BPM | RESPIRATION RATE: 16 BRPM | OXYGEN SATURATION: 98 % | HEIGHT: 62 IN | TEMPERATURE: 98 F | SYSTOLIC BLOOD PRESSURE: 130 MMHG

## 2018-04-04 DIAGNOSIS — Z12.11 COLON CANCER SCREENING: ICD-10-CM

## 2018-04-04 PROCEDURE — 88305 TISSUE EXAM BY PATHOLOGIST: CPT | Performed by: PATHOLOGY

## 2018-04-04 PROCEDURE — 45385 COLONOSCOPY W/LESION REMOVAL: CPT | Mod: PO | Performed by: INTERNAL MEDICINE

## 2018-04-04 PROCEDURE — 27201089 HC SNARE, DISP (ANY): Mod: PO | Performed by: INTERNAL MEDICINE

## 2018-04-04 PROCEDURE — 63600175 PHARM REV CODE 636 W HCPCS: Mod: PO | Performed by: NURSE ANESTHETIST, CERTIFIED REGISTERED

## 2018-04-04 PROCEDURE — D9220A PRA ANESTHESIA: Mod: PT,ANES,, | Performed by: ANESTHESIOLOGY

## 2018-04-04 PROCEDURE — D9220A PRA ANESTHESIA: Mod: PT,CRNA,, | Performed by: NURSE ANESTHETIST, CERTIFIED REGISTERED

## 2018-04-04 PROCEDURE — 37000009 HC ANESTHESIA EA ADD 15 MINS: Mod: PO | Performed by: INTERNAL MEDICINE

## 2018-04-04 PROCEDURE — 37000008 HC ANESTHESIA 1ST 15 MINUTES: Mod: PO | Performed by: INTERNAL MEDICINE

## 2018-04-04 PROCEDURE — 45385 COLONOSCOPY W/LESION REMOVAL: CPT | Mod: PT,,, | Performed by: INTERNAL MEDICINE

## 2018-04-04 PROCEDURE — 88305 TISSUE EXAM BY PATHOLOGIST: CPT | Mod: 26,,, | Performed by: PATHOLOGY

## 2018-04-04 RX ORDER — HYDROGEN PEROXIDE 3 %
20 SOLUTION, NON-ORAL MISCELLANEOUS NIGHTLY
COMMUNITY
End: 2019-11-15

## 2018-04-04 RX ORDER — LIDOCAINE HCL/PF 100 MG/5ML
SYRINGE (ML) INTRAVENOUS
Status: DISCONTINUED | OUTPATIENT
Start: 2018-04-04 | End: 2018-04-04

## 2018-04-04 RX ORDER — LIDOCAINE HYDROCHLORIDE 10 MG/ML
1 INJECTION, SOLUTION EPIDURAL; INFILTRATION; INTRACAUDAL; PERINEURAL ONCE
Status: DISCONTINUED | OUTPATIENT
Start: 2018-04-04 | End: 2018-04-04 | Stop reason: HOSPADM

## 2018-04-04 RX ORDER — SODIUM CHLORIDE, SODIUM LACTATE, POTASSIUM CHLORIDE, CALCIUM CHLORIDE 600; 310; 30; 20 MG/100ML; MG/100ML; MG/100ML; MG/100ML
INJECTION, SOLUTION INTRAVENOUS CONTINUOUS
Status: DISCONTINUED | OUTPATIENT
Start: 2018-04-04 | End: 2018-04-04 | Stop reason: HOSPADM

## 2018-04-04 RX ORDER — PROPOFOL 10 MG/ML
VIAL (ML) INTRAVENOUS
Status: DISCONTINUED | OUTPATIENT
Start: 2018-04-04 | End: 2018-04-04

## 2018-04-04 RX ADMIN — PROPOFOL 50 MG: 10 INJECTION, EMULSION INTRAVENOUS at 09:04

## 2018-04-04 RX ADMIN — LIDOCAINE HYDROCHLORIDE 100 MG: 20 INJECTION, SOLUTION INTRAVENOUS at 09:04

## 2018-04-04 RX ADMIN — SODIUM CHLORIDE, SODIUM LACTATE, POTASSIUM CHLORIDE, CALCIUM CHLORIDE: 600; 310; 30; 20 INJECTION, SOLUTION INTRAVENOUS at 09:04

## 2018-04-04 NOTE — DISCHARGE INSTRUCTIONS
Recovery After Procedural Sedation (Adult)  You have been given medicine by vein to make you sleep during your surgery. This may have included both a pain medicine and sleeping medicine. Most of the effects have worn off. But you may still have some drowsiness for the next 6 to 8 hours.  Home care  Follow these guidelines when you get home:  · For the next 8 hours, you should be watched by a responsible adult. This person should make sure your condition is not getting worse.  · Don't drink any alcohol for the next 24 hours.  · Don't drive, operate dangerous machinery, or make important business or personal decisions during the next 24 hours.  Note: Your healthcare provider may tell you not to take any medicine by mouth for pain or sleep in the next 4 hours. These medicines may react with the medicines you were given in the hospital. This could cause a much stronger response than usual.  Follow-up care  Follow up with your healthcare provider if you are not alert and back to your usual level of activity within 12 hours.  When to seek medical advice  Call your healthcare provider right away if any of these occur:  · Drowsiness gets worse  · Weakness or dizziness gets worse  · Repeated vomiting  · You can't be awakened   Date Last Reviewed: 10/18/2016  © 2658-9806 [employer] [address] [place] [address], [place] [address]. All rights reserved. This information is not intended as a substitute for professional medical care. Always follow your healthcare professional's instructions.      PROBIOTICS:  Now that your colon is so cleaned out, now is a good time for a round of PROBIOTICS.  Eat a container of Greek Yogurt, such as OIKOS or CHOBANI,  Or Activia or Dannon    Greek Yogurt.    Or Take a similar Probiotic product such as Align or Culturelle or Alyssa-Q, every day for a month.                  (The products listed are non-prescription, but you may need to ask the pharmacist for their location.)  Repeat  this 4-6 times a year.        High-Fiber Diet  Fiber is in fruits, vegetables, cereals, and grains. Fiber passes through your body undigested. A high-fiber diet helps food move through your intestinal tract. The added bulk is helpful in preventing constipation. In people with diverticulosis, fiber helps clean out the pouches along the colon wall. It also prevents new pouches from forming. A high-fiber diet reduces the risk of colon cancer. It also lowers blood cholesterol and prevents high blood sugar in people with diabetes.    The fiber-rich foods listed below should be part of your diet. If you are not used to high-fiber foods, start with 1 or 2 foods from this list. Every 3 to 4 days add a new one to your diet. Do this until you are eating 4 high-fiber foods per day. This should give you 20 to 35 grams of fiber a day. It is also important to drink a lot of water when you are on this diet. You should have 6 to 8 glasses of water a day. Water makes the fiber swell and increases the benefit.  Foods high in dietary fiber  The following foods are high in dietary fiber:  · Breads. Breads made with 100% whole-wheat flour; anshul, wheat, or rye crackers; whole-grain tortillas, bran muffins.  · Cereals. Whole-grain and bran cereals with bran (shredded wheat, wheat flakes, raisin bran, corn bran); oatmeal, rolled oats, granola, and brown rice.  · Fruits. Fresh fruits and their edible skins (pears, prunes, raisins, berries, apples, and apricots); bananas, citrus fruit, mangoes, pineapple; and prune juice.  · Nuts. Any nuts and seeds.  · Vegetables. Best served raw or lightly cooked. All types, especially: green peas, celery, eggplant, potatoes, spinach, broccoli, [place] sprouts, winter squash, carrots, cauliflower, soybeans, lentils, and fresh and dried beans of all kinds.  · Other. Popcorn, any spices.  Date Last Reviewed: 8/1/2016  © 8915-7303 [employer]. [address] [place] [address], [place] [address]. All  rights reserved. This information is not intended as a substitute for professional medical care. Always follow your healthcare professional's instructions.

## 2018-04-04 NOTE — ANESTHESIA POSTPROCEDURE EVALUATION
"Anesthesia Post Evaluation    Patient: Severiano Downing    Procedure(s) Performed: Procedure(s) (LRB):  COLONOSCOPY (N/A)    Final Anesthesia Type: general  Patient location during evaluation: PACU  Patient participation: Yes- Able to Participate  Level of consciousness: awake and alert  Post-procedure vital signs: reviewed and stable  Pain management: adequate  Airway patency: patent  PONV status at discharge: No PONV  Anesthetic complications: no      Cardiovascular status: blood pressure returned to baseline  Respiratory status: unassisted  Hydration status: euvolemic  Follow-up not needed.        Visit Vitals  /68 (BP Location: Left arm, Patient Position: Lying)   Pulse 70   Temp 36.6 °C (97.8 °F) (Skin)   Resp 16   Ht 5' 2" (1.575 m)   Wt 81.6 kg (180 lb)   SpO2 98%   Breastfeeding? No   BMI 32.92 kg/m²       Pain/Jae Score: Pain Assessment Performed: Yes (4/4/2018 10:33 AM)  Presence of Pain: denies (4/4/2018 10:33 AM)  Jae Score: 10 (4/4/2018 10:33 AM)      "

## 2018-04-04 NOTE — ANESTHESIA PREPROCEDURE EVALUATION
04/04/2018  Severiano Downing is a 67 y.o., female.    Anesthesia Evaluation    I have reviewed the Patient Summary Reports.    I have reviewed the Nursing Notes.   I have reviewed the Medications.     Review of Systems  Anesthesia Hx:  Denies Family Hx of Anesthesia complications.   Denies Personal Hx of Anesthesia complications.   Cardiovascular:   Hypertension CAD asymptomatic CABG/stent     Pulmonary:   Shortness of breath    Hepatic/GI:   Bowel Prep. GERD    Endocrine:   Hypothyroidism    Psych:   Psychiatric History          Physical Exam  General:  Obesity    Airway/Jaw/Neck:  Airway Findings: Mouth Opening: Normal Tongue: Normal  General Airway Assessment: Adult  Mallampati: II  TM Distance: Normal, at least 6 cm         Dental:  DENTAL FINDINGS: Normal   Chest/Lungs:  Chest/Lungs Clear    Heart/Vascular:  Heart Findings: Normal            Anesthesia Plan  Type of Anesthesia, risks & benefits discussed:  Anesthesia Type:  general  Patient's Preference:   Intra-op Monitoring Plan: standard ASA monitors  Intra-op Monitoring Plan Comments:   Post Op Pain Control Plan:   Post Op Pain Control Plan Comments:   Induction:   IV  Beta Blocker:  Patient is on a Beta-Blocker and has received one dose within the past 24 hours (No further documentation required).       Informed Consent: Patient understands risks and agrees with Anesthesia plan.  Questions answered. Anesthesia consent signed with patient.  ASA Score: 3     Day of Surgery Review of History & Physical:    H&P update referred to the provider.         Ready For Surgery From Anesthesia Perspective.

## 2018-04-04 NOTE — PROVATION PATIENT INSTRUCTIONS
Discharge Summary/Instructions for after Colonoscopy with   Biopsy/Polypectomy  Severiano Downing    Wednesday, April 04, 2018  Gus Chairez MD  RESTRICTIONS ON ACTIVITY:  - Do not drive a car or operate machinery until the day after the procedure.      - The following day: return to full activity including work.  - For  3 days: No heavy lifting, straining or running.  - Diet: You can have solid foods, but no gassy foods (i.e. beans, broccoli,   cabbage, etc).  TREATMENT FOR COMMON SIDE EFFECTS:  - Mild abdominal pain and bloating or excessive gas: rest, eat lightly and   use a heating pad.  SYMPTOMS TO WATCH FOR AND REPORT TO YOUR PHYSICIAN:  1. Severe abdominal pain.  2. Fever within 24 hours after a procedure.  3. A large amount of rectal bleeding. (A small amount of blood from the   rectum is not serious, especially if hemorrhoids are present.  3.  Because air was put into your colon during the procedure, expelling   large amounts of air from your rectum is normal.  4.  You may not have a bowel movement for 1-3 days because of the   colonoscopy prep.  This is normal.  5.  Call immediately if you notice any of the following:   Chills and/or fever over 101   Persistent vomiting   Severe abdominal pain, other than gas cramps   Severe chest pain   Black, tarry stools   Any bleeding - exceeding one tablespoon  Your doctor recommends these additional instructions:  We are waiting for your pathology results.   Your physician has recommended a repeat colonoscopy in 5 years for   surveillance.   Eat a high fiber diet.   Take a PROBIOTIC, such as a carton of GREEK YOGURT (Chobani or Oikos,  or   Activia or Dannon);  or tablets of ALIGN or CULTURELLE or RANGEL-Q (all   non-prescription), every day for a month.   And repeat this 3-4 times a   year.  Continue your present medications.   Resume taking Plavix (clopidogrel) at your prior dose tomorrow.  None  If you have any questions or problems, please call your  physician.  EMERGENCY PHONE NUMBER: [phone]  LAB RESULTS: Call in two (2) weeks for lab results, [phone]  ___________________________________________  Nurse Signature  ___________________________________________  Patient/Designated Responsible Party Signature  Gus Chairez MD  4/4/2018 10:16:36 AM  This report has been verified and signed electronically.

## 2018-04-04 NOTE — H&P
History & Physical - Short Stay  Gastroenterology      SUBJECTIVE:     Procedure: Colonoscopy    Chief Complaint/Indication for Procedure: Screening    History of Present Illness:  Asymptomatic    Office Visit     3/7/2018  [facility] - Cardiology      Rick Hill MD   Cardiology   Coronary artery disease involving native coronary artery of native heart without angina pectoris +4 more   Dx    Progress Notes        Subjective:    Patient ID:  Severiano Downing is a 67 y.o. female who presents for follow-up of cad     HPI  She comes with no complaints, no chest pain, no shortness of breath        Review of Systems   Constitution: Negative for decreased appetite, weakness, malaise/fatigue, weight gain and weight loss.   Cardiovascular: Negative for chest pain, dyspnea on exertion, leg swelling, palpitations and syncope.   Respiratory: Negative for cough and shortness of breath.    Gastrointestinal: Negative.    All other systems reviewed and are negative.    Assessment:       1. Coronary artery disease involving native coronary artery of native heart without angina pectoris    2. S/P coronary artery stent placement    3. Dyslipidemia    4. Essential hypertension    5. Palpitations       Plan:   Resume plavix, lipitor  Continue all cardiac medications  Regular exercise program  Weight loss  1 yr f/u with labs, ccfd               PTA Medications   Medication Sig    atorvastatin (LIPITOR) 10 MG tablet Take 1 tablet (10 mg total) by mouth once daily.    carvedilol (COREG) 3.125 MG tablet Take 1 tablet (3.125 mg total) by mouth 2 (two) times daily with meals.    cetirizine (ZYRTEC) 10 MG tablet Take 10 mg by mouth once daily.     clopidogrel (PLAVIX) 75 mg tablet TAKE 1 TABLET ONE TIME DAILY    ergocalciferol (VITAMIN D2) 50,000 unit Cap Take 1 capsule (50,000 Units total) by mouth twice a week.    esomeprazole (NEXIUM) 20 MG capsule Take 20 mg by mouth before breakfast.    levothyroxine (SYNTHROID) 75 MCG  tablet TAKE 1 TABLET ONE TIME DAILY    melatonin 5 mg Tab Take by mouth every evening.     multivitamin capsule Take 1 capsule by mouth once daily.      sertraline (ZOLOFT) 50 MG tablet Take 1 tablet (50 mg total) by mouth once daily. (Patient taking differently: Take 25 mg by mouth once daily. )    sodium,potassium,mag sulfates (SUPREP BOWEL PREP KIT) 17.5-3.13-1.6 gram SolR Take as instructed on prep sheet    tizanidine (ZANAFLEX) 4 MG tablet Take 4 mg by mouth nightly as needed.     zolpidem (AMBIEN) 5 MG Tab Take 1 tablet (5 mg total) by mouth every evening. (Patient taking differently: Take 5 mg by mouth nightly as needed. )       Review of patient's allergies indicates:   Allergen Reactions    Iodine and iodide containing products Anaphylaxis    Avelox [moxifloxacin] Nausea Only    Codeine Nausea Only    Doxycycline Rash    Sulfa (sulfonamide antibiotics) Rash        Past Medical History:   Diagnosis Date    Anticoagulant long-term use     plavix    Anxiety state 9/28/2010    CAD (coronary artery disease)     Depression     Dyslipidemia     GERD (gastroesophageal reflux disease)     Hypothyroidism 8/17/2012    Osteopenia 2013    repeat dexa in 2015    S/P coronary artery stent placement     Thyroid disease     Vitamin D deficiency      Past Surgical History:   Procedure Laterality Date    CHOLECYSTECTOMY      COLON[place]COPY  ~2000    ([place]).    COLONOSCOPY W/ POLYPECTOMY  10/16/2007    [facility].   One 2 mm polyp in the ascending colon.  A few sigmoid diverticula.    CORONARY ANGIOPLASTY WITH STENT PLACEMENT  2012    ECTOPIC PREGNANCY SURGERY Left     MINI-LAPAROTOMY      unilateral oopherectomy with salpingectomy for ECTOPIC pregnancy     Family History   Problem Relation Age of Onset    Cancer Mother     Heart disease Mother     Hyperlipidemia Mother     Hypertension Mother     Breast cancer Mother     Cancer Father     Heart disease Father     Hyperlipidemia Father   "   Hypertension Father     Cancer Sister     Ovarian cancer Sister      Social History   Substance Use Topics    Smoking status: Former Smoker     Types: Cigarettes    Smokeless tobacco: Never Used      Comment: quit 35 years ago    Alcohol use 0.6 oz/week     1 Glasses of wine per week      Comment:  2 times week         OBJECTIVE:     Vital Signs (Most Recent)  Temp: 97.3 °F (36.3 °C) (04/04/18 0915)  Pulse: 79 (04/04/18 0915)  Resp: 16 (04/04/18 0915)  BP: (!) 142/74 (04/04/18 0915)  SpO2: 96 % (04/04/18 0915)    Physical Exam:           :  Ht 5' 1" (1.549 m)    Wt 84.4 kg (186 lb 1.1 oz)    BMI 35.16 kg/m²                                                   GENERAL:  Comfortable, in no acute distress.                                 HEENT EXAM:  Nonicteric.  No adenopathy.  Oropharynx is clear.               NECK:  Supple.                                                               LUNGS:  Clear.                                                               CARDIAC:  Regular rate and rhythm.  S1, S2.  No murmur.                      ABDOMEN:  Soft, positive bowel sounds, nontender.  No hepatosplenomegaly or masses.  No rebound or guarding.                                             EXTREMITIES:  No edema.     MENTAL STATUS:  Alert and oriented.    ASSESSMENT/PLAN:     Assessment: Colorectal cancer screening    Plan: Colonoscopy    Anesthesia Plan:   MAC / General Anaesthesia    ASA Grade: ASA 2 - Patient with mild systemic disease with no functional limitations    MALLAMPATI SCORE: II (hard and soft palate, upper portion of tonsils anduvula visible)    "

## 2018-04-04 NOTE — TRANSFER OF CARE
"Anesthesia Transfer of Care Note    Patient: Severiano Downing    Procedure(s) Performed: Procedure(s) (LRB):  COLONOSCOPY (N/A)    Patient location: PACU    Anesthesia Type: general    Transport from OR: Transported from OR on 2-3 L/min O2 by NC with adequate spontaneous ventilation    Post pain: adequate analgesia    Post assessment: no apparent anesthetic complications    Post vital signs: stable    Level of consciousness: awake    Nausea/Vomiting: no nausea/vomiting    Complications: none    Transfer of care protocol was followed      Last vitals:   Visit Vitals  BP (!) 142/74 (BP Location: Right arm, Patient Position: Sitting)   Pulse 79   Temp 36.3 °C (97.3 °F) (Skin)   Resp 16   Ht 5' 2" (1.575 m)   Wt 81.6 kg (180 lb)   SpO2 96%   Breastfeeding? No   BMI 32.92 kg/m²     "

## 2018-04-23 ENCOUNTER — CLINICAL SUPPORT (OUTPATIENT)
Dept: CARDIOLOGY | Facility: CLINIC | Age: 68
End: 2018-04-23
Payer: MEDICARE

## 2018-04-23 DIAGNOSIS — I25.10 CORONARY ARTERY DISEASE, ANGINA PRESENCE UNSPECIFIED, UNSPECIFIED VESSEL OR LESION TYPE, UNSPECIFIED WHETHER NATIVE OR TRANSPLANTED HEART: Primary | ICD-10-CM

## 2018-04-23 DIAGNOSIS — I25.10 CORONARY ARTERY DISEASE, ANGINA PRESENCE UNSPECIFIED, UNSPECIFIED VESSEL OR LESION TYPE, UNSPECIFIED WHETHER NATIVE OR TRANSPLANTED HEART: ICD-10-CM

## 2018-04-23 PROCEDURE — 93010 ELECTROCARDIOGRAM REPORT: CPT | Mod: S$PBB,,, | Performed by: INTERNAL MEDICINE

## 2018-04-23 PROCEDURE — 93005 ELECTROCARDIOGRAM TRACING: CPT | Mod: PBBFAC,PO | Performed by: INTERNAL MEDICINE

## 2018-04-24 ENCOUNTER — PATIENT MESSAGE (OUTPATIENT)
Dept: CARDIOLOGY | Facility: CLINIC | Age: 68
End: 2018-04-24

## 2018-05-03 ENCOUNTER — PATIENT OUTREACH (OUTPATIENT)
Dept: ADMINISTRATIVE | Facility: HOSPITAL | Age: 68
End: 2018-05-03

## 2018-05-03 NOTE — PROGRESS NOTES
Pre-Visit Chart audit complete and HM updated.  Alert PCP/Staff concering HM updates needed. Dexa scan order pended.

## 2018-05-04 ENCOUNTER — OFFICE VISIT (OUTPATIENT)
Dept: FAMILY MEDICINE | Facility: CLINIC | Age: 68
End: 2018-05-04
Payer: MEDICARE

## 2018-05-04 VITALS
WEIGHT: 186.06 LBS | TEMPERATURE: 98 F | HEART RATE: 72 BPM | BODY MASS INDEX: 34.24 KG/M2 | HEIGHT: 62 IN | SYSTOLIC BLOOD PRESSURE: 135 MMHG | DIASTOLIC BLOOD PRESSURE: 64 MMHG

## 2018-05-04 DIAGNOSIS — R53.83 FATIGUE, UNSPECIFIED TYPE: ICD-10-CM

## 2018-05-04 DIAGNOSIS — R79.9 ABNORMAL FINDING OF BLOOD CHEMISTRY: ICD-10-CM

## 2018-05-04 DIAGNOSIS — R35.0 URINARY FREQUENCY: Primary | ICD-10-CM

## 2018-05-04 DIAGNOSIS — Z78.0 ASYMPTOMATIC AGE-RELATED POSTMENOPAUSAL STATE: ICD-10-CM

## 2018-05-04 DIAGNOSIS — Z87.898 HISTORY OF PREDIABETES: ICD-10-CM

## 2018-05-04 LAB
BILIRUB UR QL STRIP: NEGATIVE
CLARITY UR: CLEAR
COLOR UR: YELLOW
GLUCOSE UR QL STRIP: NEGATIVE
HGB UR QL STRIP: ABNORMAL
KETONES UR QL STRIP: NEGATIVE
LEUKOCYTE ESTERASE UR QL STRIP: NEGATIVE
NITRITE UR QL STRIP: NEGATIVE
PH UR STRIP: 6 [PH] (ref 5–8)
PROT UR QL STRIP: NEGATIVE
SP GR UR STRIP: 1.01 (ref 1–1.03)
URN SPEC COLLECT METH UR: ABNORMAL

## 2018-05-04 PROCEDURE — 99214 OFFICE O/P EST MOD 30 MIN: CPT | Mod: PBBFAC,PO | Performed by: NURSE PRACTITIONER

## 2018-05-04 PROCEDURE — 81002 URINALYSIS NONAUTO W/O SCOPE: CPT | Mod: PO

## 2018-05-04 PROCEDURE — 99999 PR PBB SHADOW E&M-EST. PATIENT-LVL IV: CPT | Mod: PBBFAC,,, | Performed by: NURSE PRACTITIONER

## 2018-05-04 PROCEDURE — 99213 OFFICE O/P EST LOW 20 MIN: CPT | Mod: S$PBB,,, | Performed by: NURSE PRACTITIONER

## 2018-05-04 NOTE — PROGRESS NOTES
"Subjective:      Patient ID: Severiano Downing is a 67 y.o. female.    Chief Complaint: Urinary Frequency and Fatigue    HPI   Patient to clinic for concern that she may be having increased glucose.  She has previously been diagnosed with prediabetes and improved her condition with exercise and proper eating habits.  Unfortunately, she lost her  in January.  Since then she has not been eating as well or exercising.  She admits she is coping well now.  Three weeks ago she started noticing that she is experiencing increased nocturia, fatigue, polyphagia, and increase in weight.  She also reports she has had some dizziness when hungry.  She does not check her glucose.      Lab Results   Component Value Date    HGBA1C 6.0 (H) 05/07/2018       Health Maintenance Due   Topic Date Due    TETANUS VACCINE  07/11/1968    Pneumococcal (65+) (2 of 2 - PCV13) 03/23/2016       Review of Systems   Constitutional: Positive for fatigue. Negative for chills and fever.   Respiratory: Negative for cough, shortness of breath and wheezing.    Cardiovascular: Negative for chest pain, palpitations and leg swelling.   Endocrine: Positive for polyphagia and polyuria.   Skin: Negative for rash and wound.   Neurological: Positive for dizziness. Negative for weakness and numbness.   Psychiatric/Behavioral: The patient is not nervous/anxious.        Objective:     /64   Pulse 72   Temp 98.2 °F (36.8 °C) (Oral)   Ht 5' 2" (1.575 m)   Wt 84.4 kg (186 lb 1.1 oz)   BMI 34.03 kg/m²     Physical Exam   Constitutional: She is oriented to person, place, and time. She appears well-developed and well-nourished.   HENT:   Head: Normocephalic.   Eyes: Pupils are equal, round, and reactive to light.   Cardiovascular: Normal rate, regular rhythm and normal heart sounds.    Pulmonary/Chest: Effort normal and breath sounds normal. No respiratory distress. She has no decreased breath sounds. She has no wheezes. She has no rhonchi. She has no " rales.   Abdominal: Soft. Bowel sounds are normal. She exhibits no distension and no mass. There is no tenderness. There is no rebound and no guarding.   Lymphadenopathy:     She has no cervical adenopathy.   Neurological: She is alert and oriented to person, place, and time.   Skin: Skin is warm and dry. No rash noted.   Psychiatric: She has a normal mood and affect. Her behavior is normal. Judgment and thought content normal.   Vitals reviewed.    Assessment:     1. Urinary frequency    2. Fatigue, unspecified type    3. History of prediabetes    4. Abnormal finding of blood chemistry     5. Asymptomatic age-related postmenopausal state        Plan:     Problem List Items Addressed This Visit     None      Visit Diagnoses     Urinary frequency    -  Primary    Relevant Orders    Urinalysis (Completed)    Fatigue, unspecified type        Relevant Orders    Hemoglobin A1c (Completed)    CBC auto differential (Completed)    Comprehensive metabolic panel (Completed)    TSH (Completed)    History of prediabetes        Relevant Orders    Urinalysis (Completed)    Hemoglobin A1c (Completed)    Comprehensive metabolic panel (Completed)    Abnormal finding of blood chemistry         Relevant Orders    Hemoglobin A1c (Completed)    Asymptomatic age-related postmenopausal state        Relevant Orders    DXA Bone Density Spine And Hip (Completed)      Patient refused pneumonia vaccine  DXA ordered to fulfill health maintenance  Discussed healthy eating habits and importance of exercise to promote weight loss and reduce A1C  Will call with results    Follow-up in about 6 months (around 11/4/2018), or if symptoms worsen or fail to improve.

## 2018-05-07 ENCOUNTER — HOSPITAL ENCOUNTER (OUTPATIENT)
Dept: RADIOLOGY | Facility: HOSPITAL | Age: 68
Discharge: HOME | End: 2018-05-07
Attending: NURSE PRACTITIONER
Payer: MEDICARE

## 2018-05-07 ENCOUNTER — PATIENT MESSAGE (OUTPATIENT)
Dept: FAMILY MEDICINE | Facility: CLINIC | Age: 68
End: 2018-05-07

## 2018-05-07 DIAGNOSIS — Z78.0 ASYMPTOMATIC AGE-RELATED POSTMENOPAUSAL STATE: ICD-10-CM

## 2018-05-07 PROCEDURE — 77080 DXA BONE DENSITY AXIAL: CPT | Mod: TC,PO

## 2018-05-07 PROCEDURE — 77080 DXA BONE DENSITY AXIAL: CPT | Mod: 26,,, | Performed by: RADIOLOGY

## 2018-05-08 ENCOUNTER — PATIENT MESSAGE (OUTPATIENT)
Dept: FAMILY MEDICINE | Facility: CLINIC | Age: 68
End: 2018-05-08

## 2018-05-08 DIAGNOSIS — Z87.898 HISTORY OF PREDIABETES: Primary | ICD-10-CM

## 2018-05-08 DIAGNOSIS — R73.01 IMPAIRED FASTING GLUCOSE: ICD-10-CM

## 2018-05-14 RX ORDER — TIZANIDINE 4 MG/1
TABLET ORAL
Qty: 90 TABLET | Refills: 2 | Status: SHIPPED | OUTPATIENT
Start: 2018-05-14 | End: 2018-12-14 | Stop reason: SDUPTHER

## 2018-06-19 ENCOUNTER — PATIENT MESSAGE (OUTPATIENT)
Dept: FAMILY MEDICINE | Facility: CLINIC | Age: 68
End: 2018-06-19

## 2018-06-19 DIAGNOSIS — F51.01 PRIMARY INSOMNIA: ICD-10-CM

## 2018-06-20 RX ORDER — ZOLPIDEM TARTRATE 5 MG/1
5 TABLET ORAL NIGHTLY PRN
Qty: 90 TABLET | Refills: 1 | Status: SHIPPED | OUTPATIENT
Start: 2018-06-20 | End: 2019-07-31 | Stop reason: SDUPTHER

## 2018-07-05 ENCOUNTER — PATIENT MESSAGE (OUTPATIENT)
Dept: GASTROENTEROLOGY | Facility: CLINIC | Age: 68
End: 2018-07-05

## 2018-07-05 ENCOUNTER — TELEPHONE (OUTPATIENT)
Dept: GASTROENTEROLOGY | Facility: CLINIC | Age: 68
End: 2018-07-05

## 2018-07-05 NOTE — TELEPHONE ENCOUNTER
----- Message from Gilda Pro sent at 7/5/2018 10:11 AM CDT -----  Contact: self  Patient needs first available appointment due to upper GI. Please call patient at [phone]. Thanks!

## 2018-07-05 NOTE — TELEPHONE ENCOUNTER
This pt is scheduled for a colonoscopy on 7/23/18    Can she hold her Plavix for 3-5 days prior to the procedure??

## 2018-07-10 ENCOUNTER — OFFICE VISIT (OUTPATIENT)
Dept: FAMILY MEDICINE | Facility: CLINIC | Age: 68
End: 2018-07-10
Payer: MEDICARE

## 2018-07-10 ENCOUNTER — HOSPITAL ENCOUNTER (OUTPATIENT)
Dept: RADIOLOGY | Facility: HOSPITAL | Age: 68
Discharge: HOME | End: 2018-07-10
Attending: NURSE PRACTITIONER
Payer: MEDICARE

## 2018-07-10 VITALS
TEMPERATURE: 98 F | HEART RATE: 69 BPM | HEIGHT: 62 IN | SYSTOLIC BLOOD PRESSURE: 115 MMHG | BODY MASS INDEX: 33.86 KG/M2 | DIASTOLIC BLOOD PRESSURE: 78 MMHG | WEIGHT: 184 LBS

## 2018-07-10 DIAGNOSIS — R07.81 RIB PAIN ON LEFT SIDE: ICD-10-CM

## 2018-07-10 DIAGNOSIS — R07.81 RIB PAIN ON LEFT SIDE: Primary | ICD-10-CM

## 2018-07-10 PROCEDURE — 71100 X-RAY EXAM RIBS UNI 2 VIEWS: CPT | Mod: TC,PO

## 2018-07-10 PROCEDURE — 99213 OFFICE O/P EST LOW 20 MIN: CPT | Mod: PBBFAC,25,PO | Performed by: NURSE PRACTITIONER

## 2018-07-10 PROCEDURE — 71100 X-RAY EXAM RIBS UNI 2 VIEWS: CPT | Mod: 26,,, | Performed by: RADIOLOGY

## 2018-07-10 PROCEDURE — 99213 OFFICE O/P EST LOW 20 MIN: CPT | Mod: S$PBB,,, | Performed by: NURSE PRACTITIONER

## 2018-07-10 PROCEDURE — 99999 PR PBB SHADOW E&M-EST. PATIENT-LVL III: CPT | Mod: PBBFAC,,, | Performed by: NURSE PRACTITIONER

## 2018-07-10 NOTE — PROGRESS NOTES
"Subjective:      Patient ID: Severiano Downing is a 67 y.o. female.    Chief Complaint: Chest Pain    HPI   Patient to clinic with complaint of left rib pain onset after having a chiropractic adjustment.  She reports when the chiropractor attempted the 1st adjustment she felt a popping in her ribs and screamed.  She reports it was very painful.  This happened 1 week ago.  She describes the pain as a 4 to 5/10 burning pain that waxes and wanes in intensity.  She reports that the pain is most intense with deep breaths.  She does admit to a chronic cough which was present long before the pain. She does report that her ribs are tender to touch.  She denies chest pain, shortness of breath, palpitations, productive cough, fever.    Review of Systems   Constitutional: Negative for chills, fatigue and fever.   Respiratory: Positive for cough (chronic). Negative for shortness of breath and wheezing.    Cardiovascular: Negative for chest pain, palpitations and leg swelling.   Musculoskeletal:        Left rib pain   Skin: Negative for rash and wound.   Neurological: Negative for weakness and numbness.   Psychiatric/Behavioral: The patient is not nervous/anxious.        Objective:     /78   Pulse 69   Temp 98 °F (36.7 °C) (Oral)   Ht 5' 2" (1.575 m)   Wt 83.5 kg (184 lb)   BMI 33.65 kg/m²     Physical Exam   Constitutional: She is oriented to person, place, and time. She appears well-developed and well-nourished.   HENT:   Head: Normocephalic.   Eyes: Pupils are equal, round, and reactive to light.   Neck: Normal range of motion. Neck supple.   Cardiovascular: Normal rate, regular rhythm and normal heart sounds.    Pulmonary/Chest: Effort normal and breath sounds normal. No respiratory distress. She has no decreased breath sounds. She has no wheezes. She has no rhonchi. She has no rales.   Musculoskeletal:        Back:    Neurological: She is alert and oriented to person, place, and time.   Skin: Skin is warm and dry. " No rash noted.   Psychiatric: She has a normal mood and affect. Her behavior is normal. Judgment and thought content normal.   Vitals reviewed.  Details     Reading Physician Reading Date Result Priority   Jewel Severino MD 7/10/2018    Narrative     EXAMINATION:  XR RIBS 2 VIEW LEFT    CLINICAL HISTORY:  Pleurodynia    TECHNIQUE:  Two views of the left ribs were performed.    COMPARISON:  02/29/2016    FINDINGS:  No acute displaced left-sided rib fracture is evident.  Mild scarring or atelectasis in the left lower lobe.  Soft tissues are unremarkable.  Degenerative changes at the left AC joint      Impression       As above      Electronically signed by: Jewel Severino MD  Date: 07/10/2018  Time: 11:51       Assessment:     1. Rib pain on left side        Plan:     Problem List Items Addressed This Visit     None      Visit Diagnoses     Rib pain on left side    -  Primary       X-ray as above.  When compared to chest x-ray of 2016 she had left lower lobe scarring noted consistent with findings today.  Discussed with patient deep breathing and coughs to prevent pneumonia  Tylenol as needed for pain  Report to clinic if symptoms persist or fail to improve  Report to ER if symptoms worsen    Follow-up if symptoms worsen or fail to improve.        Parts of this note was dictated using voice recognition software. Please excuse any grammatical or typographical errors.   Detail Level: Simple Render Post-Care Instructions In Note?: no Medical Necessity Information: It is in your best interest to select a reason for this procedure from the list below. All of these items fulfill various CMS LCD requirements except the new and changing color options. Consent: Warned of blister, and scar. May need more than one treatment. Verbal consent obtained

## 2018-07-18 ENCOUNTER — ANESTHESIA EVENT (OUTPATIENT)
Dept: ENDOSCOPY | Facility: HOSPITAL | Age: 68
End: 2018-07-18
Payer: MEDICARE

## 2018-07-23 ENCOUNTER — HOSPITAL ENCOUNTER (OUTPATIENT)
Facility: HOSPITAL | Age: 68
Discharge: HOME | End: 2018-07-23
Attending: INTERNAL MEDICINE | Admitting: INTERNAL MEDICINE
Payer: MEDICARE

## 2018-07-23 ENCOUNTER — ANESTHESIA (OUTPATIENT)
Dept: ENDOSCOPY | Facility: HOSPITAL | Age: 68
End: 2018-07-23
Payer: MEDICARE

## 2018-07-23 ENCOUNTER — SURGERY (OUTPATIENT)
Age: 68
End: 2018-07-23

## 2018-07-23 VITALS
WEIGHT: 180 LBS | OXYGEN SATURATION: 97 % | HEIGHT: 62 IN | RESPIRATION RATE: 17 BRPM | BODY MASS INDEX: 33.13 KG/M2 | TEMPERATURE: 98 F | HEART RATE: 75 BPM | SYSTOLIC BLOOD PRESSURE: 125 MMHG | DIASTOLIC BLOOD PRESSURE: 61 MMHG

## 2018-07-23 DIAGNOSIS — K21.9 GERD (GASTROESOPHAGEAL REFLUX DISEASE): ICD-10-CM

## 2018-07-23 LAB — H PYLORI INDEX VALUE: NEGATIVE

## 2018-07-23 PROCEDURE — 88342 IMHCHEM/IMCYTCHM 1ST ANTB: CPT | Mod: 26,,, | Performed by: PATHOLOGY

## 2018-07-23 PROCEDURE — D9220A PRA ANESTHESIA: Mod: CRNA,,, | Performed by: NURSE ANESTHETIST, CERTIFIED REGISTERED

## 2018-07-23 PROCEDURE — 43239 EGD BIOPSY SINGLE/MULTIPLE: CPT | Mod: ,,, | Performed by: INTERNAL MEDICINE

## 2018-07-23 PROCEDURE — 88342 IMHCHEM/IMCYTCHM 1ST ANTB: CPT | Performed by: PATHOLOGY

## 2018-07-23 PROCEDURE — 37000009 HC ANESTHESIA EA ADD 15 MINS: Mod: PO | Performed by: INTERNAL MEDICINE

## 2018-07-23 PROCEDURE — D9220A PRA ANESTHESIA: Mod: ANES,,, | Performed by: ANESTHESIOLOGY

## 2018-07-23 PROCEDURE — 25000003 PHARM REV CODE 250: Mod: PO | Performed by: INTERNAL MEDICINE

## 2018-07-23 PROCEDURE — 27201012 HC FORCEPS, HOT/COLD, DISP: Mod: PO | Performed by: INTERNAL MEDICINE

## 2018-07-23 PROCEDURE — 87449 NOS EACH ORGANISM AG IA: CPT | Mod: PO | Performed by: INTERNAL MEDICINE

## 2018-07-23 PROCEDURE — 63600175 PHARM REV CODE 636 W HCPCS: Mod: PO | Performed by: NURSE ANESTHETIST, CERTIFIED REGISTERED

## 2018-07-23 PROCEDURE — 88305 TISSUE EXAM BY PATHOLOGIST: CPT | Performed by: PATHOLOGY

## 2018-07-23 PROCEDURE — 88305 TISSUE EXAM BY PATHOLOGIST: CPT | Mod: 26,,, | Performed by: PATHOLOGY

## 2018-07-23 PROCEDURE — 43239 EGD BIOPSY SINGLE/MULTIPLE: CPT | Mod: PO | Performed by: INTERNAL MEDICINE

## 2018-07-23 PROCEDURE — 37000008 HC ANESTHESIA 1ST 15 MINUTES: Mod: PO | Performed by: INTERNAL MEDICINE

## 2018-07-23 RX ORDER — SODIUM CHLORIDE, SODIUM LACTATE, POTASSIUM CHLORIDE, CALCIUM CHLORIDE 600; 310; 30; 20 MG/100ML; MG/100ML; MG/100ML; MG/100ML
INJECTION, SOLUTION INTRAVENOUS CONTINUOUS
Status: DISCONTINUED | OUTPATIENT
Start: 2018-07-23 | End: 2018-07-23 | Stop reason: HOSPADM

## 2018-07-23 RX ORDER — LIDOCAINE HYDROCHLORIDE 10 MG/ML
1 INJECTION, SOLUTION EPIDURAL; INFILTRATION; INTRACAUDAL; PERINEURAL ONCE
Status: COMPLETED | OUTPATIENT
Start: 2018-07-23 | End: 2018-07-23

## 2018-07-23 RX ORDER — SODIUM CHLORIDE 0.9 % (FLUSH) 0.9 %
3 SYRINGE (ML) INJECTION
Status: DISCONTINUED | OUTPATIENT
Start: 2018-07-23 | End: 2018-07-23 | Stop reason: HOSPADM

## 2018-07-23 RX ORDER — LIDOCAINE HCL/PF 100 MG/5ML
SYRINGE (ML) INTRAVENOUS
Status: DISCONTINUED | OUTPATIENT
Start: 2018-07-23 | End: 2018-07-23

## 2018-07-23 RX ORDER — PROPOFOL 10 MG/ML
VIAL (ML) INTRAVENOUS
Status: DISCONTINUED | OUTPATIENT
Start: 2018-07-23 | End: 2018-07-23

## 2018-07-23 RX ADMIN — PROPOFOL 120 MG: 10 INJECTION, EMULSION INTRAVENOUS at 01:07

## 2018-07-23 RX ADMIN — PROPOFOL 30 MG: 10 INJECTION, EMULSION INTRAVENOUS at 01:07

## 2018-07-23 RX ADMIN — LIDOCAINE HYDROCHLORIDE: 10 INJECTION, SOLUTION EPIDURAL; INFILTRATION; INTRACAUDAL; PERINEURAL at 01:07

## 2018-07-23 RX ADMIN — LIDOCAINE HYDROCHLORIDE 50 MG: 20 INJECTION, SOLUTION INTRAVENOUS at 01:07

## 2018-07-23 RX ADMIN — SODIUM CHLORIDE, SODIUM LACTATE, POTASSIUM CHLORIDE, AND CALCIUM CHLORIDE: .6; .31; .03; .02 INJECTION, SOLUTION INTRAVENOUS at 01:07

## 2018-07-23 NOTE — [employer] PATIENT INSTRUCTIONS
Discharge Summary/Instructions after an Endoscopic Procedure  Patient Name: Severiano Downing  Patient MRN: [id number]  Patient YOB: 1950 Monday, July 23, 2018  Gus Chairez MD  RESTRICTIONS:  During your procedure today, you received medications for sedation.  These   medications may affect your judgment, balance and coordination.  Therefore,   for 24 hours, you have the following restrictions:   - DO NOT drive a car, operate machinery, make legal/financial decisions,   sign important papers or drink alcohol.    ACTIVITY:  Today: no heavy lifting, straining or running due to procedural   sedation/anesthesia.  The following day: return to full activity including work.  DIET:  Eat and drink normally unless instructed otherwise.     TREATMENT FOR COMMON SIDE EFFECTS:  - Mild abdominal pain, nausea, belching, bloating or excessive gas:  rest,   eat lightly and use a heating pad.  - Sore Throat: treat with throat lozenges and/or gargle with warm salt   water.  - Because air was used during the procedure, expelling large amounts of air   from your rectum or belching is normal.  - If a bowel prep was taken, you may not have a bowel movement for 1-3 days.    This is normal.  SYMPTOMS TO WATCH FOR AND REPORT TO YOUR PHYSICIAN:  1. Abdominal pain or bloating, other than gas cramps.  2. Chest pain.  3. Back pain.  4. Signs of infection such as: chills or fever occurring within 24 hours   after the procedure.  5. Rectal bleeding, which would show as bright red, maroon, or black stools.   (A tablespoon of blood from the rectum is not serious, especially if   hemorrhoids are present.)  6. Vomiting.  7. Weakness or dizziness.  GO DIRECTLY TO THE NEAREST EMERGENCY ROOM IF YOU HAVE ANY OF THE FOLLOWING:      Difficulty breathing              Chills and/or fever over 101 F   Persistent vomiting and/or vomiting blood   Severe abdominal pain   Severe chest pain   Black, tarry stools   Bleeding- more than one  tablespoon   Any other symptom or condition that you feel may need urgent attention  Your doctor recommends these additional instructions:  If any biopsies were taken, your doctors clinic will contact you in 1 to 2   weeks with any results.  - Discharge patient to home.   - Await pathology and CLOtest results.   - Follow an antireflux regimen.   - Continue present medications.   - Use Nexium (esomeprazole) 20 mg PO daily.   - Add Zantac (ranitidine) 300 mg PO nightly.   - Return to primary care physician to discuss anti reflux surgery.   - Refer to a surgeon to discuss anti reflux surgery.   - Call the G.I. clinic in 2 weeks for reports (if you haven't heard from us   sooner)  [phone].  For questions, problems or results please call your physician - Gus Chairez MD at Work:  [phone].  EMERGENCY PHONE NUMBER: [phone], LAB RESULTS: [phone]  IF A COMPLICATION OR EMERGENCY SITUATION ARISES AND YOU ARE UNABLE TO REACH   YOUR PHYSICIAN - GO DIRECTLY TO THE EMERGENCY ROOM.  ___________________________________________  Nurse Signature  ___________________________________________  Patient/Designated Responsible Party Signature  Gus Chairez MD  7/23/2018 2:24:35 PM  This report has been verified and signed electronically.  [employer]

## 2018-07-23 NOTE — BRIEF OP NOTE
Discharge Note  Short Stay      SUMMARY     Admit Date: 7/23/2018    Attending Physician: Gus Chairez Jr., MD     Discharge Physician: Gus Chairez Jr., MD    Discharge Date: 7/23/2018 2:27 PM    Final Diagnosis: Gastroesophageal reflux disease, esophagitis presence not specified [K21.9]  Impression:          - Normal oropharynx.                       - Normal larynx.                       - (Minimal) Reflux esophagitis.                       - (Non-erosive esophageal reflux (NERD) disease).                       - Normal esophagus otherwise.                       - Z-line regular, 33 cm from the incisors.                       - Patulous lower esophageal sphincter.                       - Medium-sized hiatal hernia.                       - Minimal antritis. Biopsied.                       - Normal stomach otherwise.                       - Normal examined duodenum.  Recommendation:      - Discharge patient to home.                       - Await pathology and CLOtest results.                       - Follow an antireflux regimen.                       - Continue present medications.                       - Use Nexium (esomeprazole) 20 mg PO daily.                       - Add Zantac (ranitidine) 300 mg PO nightly.                       - Return to primary care physician to discuss anti                        reflux surgery.                       - Refer to a surgeon to discuss anti reflux surgery.                       - Call the G.I. clinic in 2 weeks for reports (if                        you haven't heard from us sooner) [phone].  Gus Chairez MD  7/23/2018  Disposition: HOME OR SELF CARE    Patient Instructions:   Current Discharge Medication List      START taking these medications    Details   ranitidine (ZANTAC) 300 MG tablet Take 1 tablet (300 mg total) by mouth every evening. , about 30-60 minutes before bedtime.  Qty: 90 tablet, Refills: 3         CONTINUE these medications which have NOT  CHANGED    Details   atorvastatin (LIPITOR) 10 MG tablet Take 1 tablet (10 mg total) by mouth once daily.  Qty: 90 tablet, Refills: 3      carvedilol (COREG) 3.125 MG tablet Take 1 tablet (3.125 mg total) by mouth 2 (two) times daily with meals.  Qty: 180 tablet, Refills: 1      cetirizine (ZYRTEC) 10 MG tablet Take 10 mg by mouth once daily.       esomeprazole (NEXIUM) 20 MG capsule Take 20 mg by mouth before breakfast.      Lactobacillus rhamnosus GG (CULTURELLE) 10 billion cell capsule Take 1 capsule by mouth once daily.      levothyroxine (SYNTHROID) 75 MCG tablet TAKE 1 TABLET ONE TIME DAILY  Qty: 90 tablet, Refills: 1      melatonin 5 mg Tab Take by mouth every evening.       multivitamin capsule Take 1 capsule by mouth once daily.        sertraline (ZOLOFT) 50 MG tablet Take 1 tablet (50 mg total) by mouth once daily.  Qty: 90 tablet, Refills: 1      tiZANidine (ZANAFLEX) 4 MG tablet TAKE 1 TABLET (4 MG TOTAL) BY MOUTH EVERY 6 (SIX) HOURS AS NEEDED.  Qty: 90 tablet, Refills: 2      zolpidem (AMBIEN) 5 MG Tab Take 1 tablet (5 mg total) by mouth nightly as needed.  Qty: 90 tablet, Refills: 1    Associated Diagnoses: Primary insomnia      clopidogrel (PLAVIX) 75 mg tablet TAKE 1 TABLET ONE TIME DAILY  Qty: 90 tablet, Refills: 3      ergocalciferol (VITAMIN D2) 50,000 unit Cap Take 1 capsule (50,000 Units total) by mouth twice a week.  Qty: 24 capsule, Refills: 1             Discharge Procedure Orders (must include Diet, Follow-up, Activity)    Follow Up:  Follow up with PCP  to discuss anti reflux surgery..  Please follow an anti reflux diet.  Activity as tolerated.    No driving day of procedure.

## 2018-07-23 NOTE — ANESTHESIA PREPROCEDURE EVALUATION
07/23/2018  Severiano Downing is a 68 y.o., female.    Pre-op Assessment    I have reviewed the Patient Summary Reports.     I have reviewed the Nursing Notes.   I have reviewed the Medications.     Review of Systems  Anesthesia Hx:  Denies Family Hx of Anesthesia complications.   Denies Personal Hx of Anesthesia complications.   Social:  Former Smoker Smoking Status: Former Smoker  Quit Smoking:    Smokeless Tobacco Status: Never Used  Alcohol use: Yes; 0.6 oz per week  Drug use: No       Cardiovascular:   Hypertension CAD asymptomatic CABG/stent  hyperlipidemia CAD (coronary artery disease)  S/P coronary artery stent placement     Pulmonary:   Shortness of breath    Hepatic/GI:   Bowel Prep. GERD    Endocrine:   Hypothyroidism    Psych:   Psychiatric History          Physical Exam  General:  Obesity    Airway/Jaw/Neck:  Airway Findings: Mouth Opening: Normal Tongue: Normal  General Airway Assessment: Adult  Mallampati: II  Improves to II with phonation.  TM Distance: Normal, at least 6 cm         Dental:  Dental Findings: In tact   Chest/Lungs:  Chest/Lungs Findings: Clear to auscultation, Normal Respiratory Rate     Heart/Vascular:  Heart Findings: Rate: Normal  Rhythm: Regular Rhythm  Sounds: Normal  Heart murmur: negative       Mental Status:  Mental Status Findings:  Cooperative, Alert and Oriented         Anesthesia Plan  Type of Anesthesia, risks & benefits discussed:  Anesthesia Type:  general  Patient's Preference:   Intra-op Monitoring Plan: standard ASA monitors  Intra-op Monitoring Plan Comments:   Post Op Pain Control Plan:   Post Op Pain Control Plan Comments:   Induction:   IV  Beta Blocker:  Patient is on a Beta-Blocker and has received one dose within the past 24 hours (No further documentation required).       Informed Consent: Patient understands risks and agrees with Anesthesia plan.   Questions answered. Anesthesia consent signed with patient.  ASA Score: 3     Day of Surgery Review of History & Physical: I have interviewed and examined the patient. I have reviewed the patient's H&P dated:  There are no significant changes.  H&P update referred to the provider.         Ready For Surgery From Anesthesia Perspective.

## 2018-07-23 NOTE — TRANSFER OF CARE
"Anesthesia Transfer of Care Note    Patient: Severiano Downing    Procedure(s) Performed: Procedure(s) (LRB):  EGD (ESOPHAGOGASTRODUODENOSCOPY) (N/A)    Patient location: PACU    Anesthesia Type: general    Transport from OR: Transported from OR on 2-3 L/min O2 by NC with adequate spontaneous ventilation    Post assessment: no apparent anesthetic complications    Post vital signs: stable    Level of consciousness: awake    Nausea/Vomiting: no nausea/vomiting    Complications: none    Transfer of care protocol was followed      Last vitals:   Visit Vitals  BP (!) 140/65 (BP Location: Right arm, Patient Position: Lying)   Pulse 72   Temp 36.4 °C (97.5 °F) (Skin)   Resp 16   Ht 5' 2" (1.575 m)   Wt 81.6 kg (180 lb)   SpO2 95%   Breastfeeding? No   BMI 32.92 kg/m²     "

## 2018-07-23 NOTE — DISCHARGE INSTRUCTIONS
Recovery After Procedural Sedation (Adult)  You have been given medicine by vein to make you sleep during your surgery. This may have included both a pain medicine and sleeping medicine. Most of the effects have worn off. But you may still have some drowsiness for the next 6 to 8 hours.  Home care  Follow these guidelines when you get home:  · For the next 8 hours, you should be watched by a responsible adult. This person should make sure your condition is not getting worse.  · Don't drink any alcohol for the next 24 hours.  · Don't drive, operate dangerous machinery, or make important business or personal decisions during the next 24 hours.  Note: Your healthcare provider may tell you not to take any medicine by mouth for pain or sleep in the next 4 hours. These medicines may react with the medicines you were given in the hospital. This could cause a much stronger response than usual.  Follow-up care  Follow up with your healthcare provider if you are not alert and back to your usual level of activity within 12 hours.  When to seek medical advice  Call your healthcare provider right away if any of these occur:  · Drowsiness gets worse  · Weakness or dizziness gets worse  · Repeated vomiting  · You can't be awakened   Date Last Reviewed: 10/18/2016  © 3637-4228 [employer]. [address] [place] [address], [place] [address]. All rights reserved. This information is not intended as a substitute for professional medical care. Always follow your healthcare professional's instructions.        Tips to Control Acid Reflux    To control acid reflux, youll need to make some basic diet and lifestyle changes. The simple steps outlined below may be all youll need to ease discomfort.  Watch what you eat  · Avoid fatty foods and spicy foods.  · Eat fewer acidic foods, such as citrus and tomato-based foods. These can increase symptoms.  · Limit drinking alcohol, caffeine, and fizzy beverages. All increase acid  reflux.  · Try limiting chocolate, peppermint, and spearmint. These can worsen acid reflux in some people.  Watch when you eat  · Avoid lying down for 3 hours after eating.  · Do not snack before going to bed.  Raise your head  Raising your head and upper body by 4 to 6 inches helps limit reflux when youre lying down. Put blocks under the head of your bed frame to raise it.  Other changes  · Lose weight, if you need to  · Dont exercise near bedtime  · Lose weight  · Avoid tight-fitting clothes  · Lose weight  · Limit aspirin and ibuprofen  · Lose weight  · Stop smoking   Date Last Reviewed: 7/1/2016  © 8334-6864 [employer]. [address] [place] [address], [place] [address]. All rights reserved. This information is not intended as a substitute for professional medical care. Always follow your healthcare professional's instructions.

## 2018-07-23 NOTE — H&P
History & Physical - Short Stay  Gastroenterology      SUBJECTIVE:     Procedure: Gastroscopy    Chief Complaint/Indication for Procedure:  Reflux    History of Present Illness:  Office Visit     5/4/2018  [facility]      Ruslan Moore NP   Family Medicine   Urinary frequency +4 more   Dx   Urinary Frequency, Fatigue   Reason for Visit    Progress Notes        Subjective:      Patient ID: Severiano Downing is a 67 y.o. female.     Chief Complaint: Urinary Frequency and Fatigue     HPI   Patient to clinic for concern that she may be having increased glucose.  She has previously been diagnosed with prediabetes and improved her condition with exercise and proper eating habits.  Unfortunately, she lost her  in January.  Since then she has not been eating as well or exercising.  She admits she is coping well now.  Three weeks ago she started noticing that she is experiencing increased nocturia, fatigue, polyphagia, and increase in weight.  She also reports she has had some dizziness when hungry.  She does not check her glucose.      Assessment:      1. Urinary frequency    2. Fatigue, unspecified type    3. History of prediabetes    4. Abnormal finding of blood chemistry     5. Asymptomatic age-related postmenopausal state          Plan:          Problem List Items Addressed This Visit      None               Visit Diagnoses      Urinary frequency    -  Primary     Relevant Orders     Urinalysis (Completed)     Fatigue, unspecified type         Relevant Orders     Hemoglobin A1c (Completed)     CBC auto differential (Completed)     Comprehensive metabolic panel (Completed)     TSH (Completed)     History of prediabetes         Relevant Orders     Urinalysis (Completed)     Hemoglobin A1c (Completed)     Comprehensive metabolic panel (Completed)     Abnormal finding of blood chemistry          Relevant Orders     Hemoglobin A1c (Completed)     Asymptomatic age-related postmenopausal state          Relevant Orders     DXA Bone Density Spine And Hip (Completed)       Patient refused pneumonia vaccine  DXA ordered to fulfill health maintenance  Discussed healthy eating habits and importance of exercise to promote weight loss and reduce A1C  Will call with results     Follow-up in about 6 months (around 11/4/2018), or if symptoms worsen or fail to improve.              PTA Medications   Medication Sig    atorvastatin (LIPITOR) 10 MG tablet Take 1 tablet (10 mg total) by mouth once daily.    carvedilol (COREG) 3.125 MG tablet Take 1 tablet (3.125 mg total) by mouth 2 (two) times daily with meals.    cetirizine (ZYRTEC) 10 MG tablet Take 10 mg by mouth once daily.     esomeprazole (NEXIUM) 20 MG capsule Take 20 mg by mouth before breakfast.    Lactobacillus rhamnosus GG (CULTURELLE) 10 billion cell capsule Take 1 capsule by mouth once daily.    levothyroxine (SYNTHROID) 75 MCG tablet TAKE 1 TABLET ONE TIME DAILY    melatonin 5 mg Tab Take by mouth every evening.     multivitamin capsule Take 1 capsule by mouth once daily.      sertraline (ZOLOFT) 50 MG tablet Take 1 tablet (50 mg total) by mouth once daily. (Patient taking differently: Take 25 mg by mouth once daily. )    tiZANidine (ZANAFLEX) 4 MG tablet TAKE 1 TABLET (4 MG TOTAL) BY MOUTH EVERY 6 (SIX) HOURS AS NEEDED.    zolpidem (AMBIEN) 5 MG Tab Take 1 tablet (5 mg total) by mouth nightly as needed.    clopidogrel (PLAVIX) 75 mg tablet TAKE 1 TABLET ONE TIME DAILY    ergocalciferol (VITAMIN D2) 50,000 unit Cap Take 1 capsule (50,000 Units total) by mouth twice a week.       Review of patient's allergies indicates:   Allergen Reactions    Iodine and iodide containing products Anaphylaxis    Ativan [lorazepam] Other (See Comments)     Pt becomes overly sedated    Avelox [moxifloxacin] Nausea Only    Codeine Nausea Only    Doxycycline Rash    Sulfa (sulfonamide antibiotics) Rash        Past Medical History:   Diagnosis Date    Anticoagulant  long-term use     plavix    Anxiety state 9/28/2010    CAD (coronary artery disease)     1 stent    Depression     Dyslipidemia     GERD (gastroesophageal reflux disease)     Hypothyroidism 8/17/2012    Osteopenia 2013    repeat dexa in 2015    S/P coronary artery stent placement     Thyroid disease     Vitamin D deficiency      Past Surgical History:   Procedure Laterality Date    CARDIAC SURGERY      PCI 2012    CHOLECYSTECTOMY      COL[place]OSCOPY  ~2000    ([place]).    COLONOSCOPY N/A 4/4/2018    Procedure: COLONOSCOPY;  Surgeon: Gus Chairez Jr., MD;  [facility];  Service: Endoscopy;  Laterality: N/A;    COLONOSCOPY W/ POLYPECTOMY  10/16/2007    [facility].   One 2 mm polyp in the ascending colon.  A few sigmoid diverticula.    CORONARY ANGIOPLASTY WITH STENT PLACEMENT  2012    ECTOPIC PREGNANCY SURGERY Left     ESOPHAGOGASTRODUODENOSCOPY  09/16/2008    [facility].   Gaping LES.  Hiatal hernia.  NERD.  Moderate antritis.    ESOPHAGOGASTRODUODENOSCOPY  11/29/2011    [facility].   Chronic GERD with no sign of Olguin's.  Hiatal hernia.  Normal stomach and duodenum.    MINI-LAPAROTOMY      unilateral oopherectomy with salpingectomy for ECTOPIC pregnancy     Family History   Problem Relation Age of Onset    Cancer Mother     Heart disease Mother     Hyperlipidemia Mother     Hypertension Mother     Breast cancer Mother     Cancer Father     Heart disease Father     Hyperlipidemia Father     Hypertension Father     Cancer Sister     Ovarian cancer Sister      Social History   Substance Use Topics    Smoking status: Former Smoker     Types: Cigarettes    Smokeless tobacco: Never Used      Comment: quit 35 years ago    Alcohol use 0.6 oz/week     1 Glasses of wine per week      Comment:  2 times week         OBJECTIVE:     Vital Signs (Most Recent)  Temp: 97.5 °F (36.4 °C) (07/23/18 1312)  Pulse: 72 (07/23/18 1312)  Resp: 16 (07/23/18 1312)  BP: (!) 140/65 (07/23/18  "1312)  SpO2: 95 % (07/23/18 1312)    Physical Exam:          :   Ht 5' 2" (1.575 m)   Wt 84.4 kg (186 lb 1.1 oz)   BMI 34.03 kg/m²                                                      GENERAL:  Comfortable, in no acute distress.                                 HEENT EXAM:  Nonicteric.  No adenopathy.  Oropharynx is clear.               NECK:  Supple.                                                               LUNGS:  Clear.                                                               CARDIAC:  Regular rate and rhythm.  S1, S2.  No murmur.                      ABDOMEN:  Soft, positive bowel sounds, nontender.  No hepatosplenomegaly or masses.  No rebound or guarding.                                             EXTREMITIES:  No edema.     MENTAL STATUS:  Alert and oriented.    ASSESSMENT/PLAN:     Assessment: Reflux    Plan: Gastroscopy    Anesthesia Plan:   MAC / General Anaesthesia    ASA Grade: ASA 2 - Patient with mild systemic disease with no functional limitations    MALLAMPATI SCORE: II (hard and soft palate, upper portion of tonsils anduvula visible)      "

## 2018-07-23 NOTE — ANESTHESIA POSTPROCEDURE EVALUATION
"Anesthesia Post Evaluation    Patient: Severiano Downing    Procedure(s) Performed: Procedure(s) (LRB):  EGD (ESOPHAGOGASTRODUODENOSCOPY) (N/A)    Final Anesthesia Type: general  Patient location during evaluation: PACU  Patient participation: Yes- Able to Participate  Level of consciousness: awake and alert and oriented  Post-procedure vital signs: reviewed and stable  Pain management: adequate  Airway patency: patent  PONV status at discharge: No PONV  Anesthetic complications: no      Cardiovascular status: blood pressure returned to baseline  Respiratory status: unassisted, spontaneous ventilation and room air  Hydration status: euvolemic  Follow-up not needed.        Visit Vitals  /78 (BP Location: Left arm, Patient Position: Lying)   Pulse 84   Temp 36.7 °C (98 °F) (Skin)   Resp 15   Ht 5' 2" (1.575 m)   Wt 81.6 kg (180 lb)   SpO2 (!) 94%   Breastfeeding? No   BMI 32.92 kg/m²       Pain/Jae Score: Pain Assessment Performed: Yes (7/23/2018  2:20 PM)  Presence of Pain: denies (7/23/2018  2:20 PM)  Jae Score: 9 (7/23/2018  2:20 PM)      "

## 2018-07-26 ENCOUNTER — PATIENT MESSAGE (OUTPATIENT)
Dept: FAMILY MEDICINE | Facility: CLINIC | Age: 68
End: 2018-07-26

## 2018-07-31 RX ORDER — ERGOCALCIFEROL 1.25 MG/1
CAPSULE ORAL
Qty: 24 CAPSULE | Refills: 0 | Status: ON HOLD | OUTPATIENT
Start: 2018-07-31 | End: 2018-10-27 | Stop reason: SDUPTHER

## 2018-08-07 ENCOUNTER — OFFICE VISIT (OUTPATIENT)
Dept: FAMILY MEDICINE | Facility: CLINIC | Age: 68
End: 2018-08-07
Payer: MEDICARE

## 2018-08-07 VITALS
HEART RATE: 71 BPM | TEMPERATURE: 98 F | WEIGHT: 188 LBS | BODY MASS INDEX: 34.6 KG/M2 | HEIGHT: 62 IN | DIASTOLIC BLOOD PRESSURE: 77 MMHG | SYSTOLIC BLOOD PRESSURE: 132 MMHG

## 2018-08-07 DIAGNOSIS — H69.92 ETD (EUSTACHIAN TUBE DYSFUNCTION), LEFT: Primary | ICD-10-CM

## 2018-08-07 DIAGNOSIS — H92.02 OTALGIA OF LEFT EAR: ICD-10-CM

## 2018-08-07 PROCEDURE — 99213 OFFICE O/P EST LOW 20 MIN: CPT | Mod: S$PBB,,, | Performed by: NURSE PRACTITIONER

## 2018-08-07 PROCEDURE — 99999 PR PBB SHADOW E&M-EST. PATIENT-LVL III: CPT | Mod: PBBFAC,,, | Performed by: NURSE PRACTITIONER

## 2018-08-07 PROCEDURE — 99213 OFFICE O/P EST LOW 20 MIN: CPT | Mod: PBBFAC,PO | Performed by: NURSE PRACTITIONER

## 2018-08-07 RX ORDER — FLUTICASONE PROPIONATE 50 MCG
2 SPRAY, SUSPENSION (ML) NASAL DAILY
Qty: 1 BOTTLE | Refills: 0 | Status: SHIPPED | OUTPATIENT
Start: 2018-08-07 | End: 2018-09-03 | Stop reason: SDUPTHER

## 2018-08-07 NOTE — PROGRESS NOTES
"Subjective:      Patient ID: Severiano Downing is a 68 y.o. female.    Chief Complaint: Otalgia    Otalgia    There is pain in the left ear. This is a recurrent problem. Episode onset: intermittent over the last 2 weeks. The problem has been waxing and waning. There has been no fever. The pain is at a severity of 4/10. Associated symptoms include hearing loss (muffled hearing) and rhinorrhea (chronic). Pertinent negatives include no coughing, ear discharge, headaches, neck pain, rash or sore throat. Treatments tried: almond oil in left ear; Zyrtec. The treatment provided mild relief.     Review of Systems   Constitutional: Negative for chills, fatigue and fever.   HENT: Positive for ear pain, hearing loss (muffled hearing) and rhinorrhea (chronic). Negative for congestion, ear discharge and sore throat.    Respiratory: Negative for cough, shortness of breath and wheezing.    Cardiovascular: Negative for chest pain, palpitations and leg swelling.   Musculoskeletal: Negative for neck pain.   Skin: Negative for rash and wound.   Neurological: Negative for weakness, numbness and headaches.   Psychiatric/Behavioral: The patient is not nervous/anxious.        Objective:     /77   Pulse 71   Temp 98.1 °F (36.7 °C) (Oral)   Ht 5' 2" (1.575 m)   Wt 85.3 kg (188 lb)   BMI 34.39 kg/m²     Physical Exam   Constitutional: She is oriented to person, place, and time. She appears well-developed and well-nourished.   HENT:   Head: Normocephalic.   Right Ear: Tympanic membrane normal.   Left Ear: A middle ear effusion (serous) is present.   Nose: Nose normal. Right sinus exhibits no maxillary sinus tenderness and no frontal sinus tenderness. Left sinus exhibits no maxillary sinus tenderness and no frontal sinus tenderness.   Mouth/Throat: Oropharynx is clear and moist and mucous membranes are normal.   Eyes: Pupils are equal, round, and reactive to light.   Neck: Normal range of motion. Neck supple.   Cardiovascular: Normal " rate, regular rhythm and normal heart sounds.    Pulmonary/Chest: Effort normal and breath sounds normal. No respiratory distress. She has no decreased breath sounds. She has no wheezes. She has no rhonchi. She has no rales.   Neurological: She is alert and oriented to person, place, and time.   Skin: Skin is warm and dry. No rash noted.   Psychiatric: She has a normal mood and affect. Her behavior is normal. Judgment and thought content normal.   Vitals reviewed.    Assessment:     1. ETD (Eustachian tube dysfunction), left    2. Otalgia of left ear        Plan:     Problem List Items Addressed This Visit     None      Visit Diagnoses     ETD (Eustachian tube dysfunction), left    -  Primary    Otalgia of left ear          If fails treatment with Flonase, will consider ENT referral  Tylenol OTC as needed for pain  Report to ER if symptoms worsen    Follow-up if symptoms worsen or fail to improve.        Parts of this note was dictated using voice recognition software. Please excuse any grammatical or typographical errors.

## 2018-08-13 RX ORDER — LEVOTHYROXINE SODIUM 75 UG/1
TABLET ORAL
Qty: 90 TABLET | Refills: 2 | Status: SHIPPED | OUTPATIENT
Start: 2018-08-13 | End: 2019-05-22 | Stop reason: SDUPTHER

## 2018-08-24 RX ORDER — CARVEDILOL 3.12 MG/1
TABLET ORAL
Qty: 180 TABLET | Refills: 1 | Status: SHIPPED | OUTPATIENT
Start: 2018-08-24 | End: 2018-10-01 | Stop reason: SDUPTHER

## 2018-09-04 RX ORDER — FLUTICASONE PROPIONATE 50 MCG
SPRAY, SUSPENSION (ML) NASAL
Qty: 16 ML | Refills: 0 | Status: ON HOLD | OUTPATIENT
Start: 2018-09-04 | End: 2019-05-07 | Stop reason: HOSPADM

## 2018-09-26 ENCOUNTER — TELEPHONE (OUTPATIENT)
Dept: FAMILY MEDICINE | Facility: CLINIC | Age: 68
End: 2018-09-26

## 2018-09-26 NOTE — TELEPHONE ENCOUNTER
----- Message from René Jimenez sent at 9/26/2018  9:20 AM CDT -----  Contact: pt  She is requesting an order for urine specimen to be processed due to blood found in her urine,  please advise [phone]

## 2018-09-27 ENCOUNTER — PATIENT OUTREACH (OUTPATIENT)
Dept: ADMINISTRATIVE | Facility: HOSPITAL | Age: 68
End: 2018-09-27

## 2018-09-28 ENCOUNTER — OFFICE VISIT (OUTPATIENT)
Dept: FAMILY MEDICINE | Facility: CLINIC | Age: 68
End: 2018-09-28
Payer: MEDICARE

## 2018-09-28 ENCOUNTER — HOSPITAL ENCOUNTER (OUTPATIENT)
Dept: RADIOLOGY | Facility: HOSPITAL | Age: 68
Discharge: HOME | End: 2018-09-28
Attending: FAMILY MEDICINE
Payer: MEDICARE

## 2018-09-28 VITALS
TEMPERATURE: 98 F | HEIGHT: 62 IN | HEART RATE: 71 BPM | SYSTOLIC BLOOD PRESSURE: 142 MMHG | WEIGHT: 184 LBS | BODY MASS INDEX: 33.86 KG/M2 | DIASTOLIC BLOOD PRESSURE: 76 MMHG

## 2018-09-28 DIAGNOSIS — R31.9 HEMATURIA, UNSPECIFIED TYPE: Primary | ICD-10-CM

## 2018-09-28 DIAGNOSIS — R31.0 GROSS HEMATURIA: ICD-10-CM

## 2018-09-28 DIAGNOSIS — R31.9 HEMATURIA, UNSPECIFIED TYPE: ICD-10-CM

## 2018-09-28 LAB
BACTERIA #/AREA URNS HPF: ABNORMAL /HPF
BILIRUB UR QL STRIP: NEGATIVE
CLARITY UR: CLEAR
COLOR UR: YELLOW
GLUCOSE UR QL STRIP: NEGATIVE
HGB UR QL STRIP: ABNORMAL
KETONES UR QL STRIP: NEGATIVE
LEUKOCYTE ESTERASE UR QL STRIP: ABNORMAL
MICROSCOPIC COMMENT: ABNORMAL
NITRITE UR QL STRIP: NEGATIVE
PH UR STRIP: 6 [PH] (ref 5–8)
PROT UR QL STRIP: NEGATIVE
RBC #/AREA URNS HPF: 1 /HPF (ref 0–4)
SP GR UR STRIP: 1.01 (ref 1–1.03)
SQUAMOUS #/AREA URNS HPF: 6 /HPF
URN SPEC COLLECT METH UR: ABNORMAL
WBC #/AREA URNS HPF: 2 /HPF (ref 0–5)

## 2018-09-28 PROCEDURE — 74176 CT ABD & PELVIS W/O CONTRAST: CPT | Mod: TC,PO

## 2018-09-28 PROCEDURE — 99999 PR PBB SHADOW E&M-EST. PATIENT-LVL IV: CPT | Mod: PBBFAC,,, | Performed by: FAMILY MEDICINE

## 2018-09-28 PROCEDURE — 99214 OFFICE O/P EST MOD 30 MIN: CPT | Mod: S$PBB,,, | Performed by: FAMILY MEDICINE

## 2018-09-28 PROCEDURE — 99214 OFFICE O/P EST MOD 30 MIN: CPT | Mod: PBBFAC,25,PO | Performed by: FAMILY MEDICINE

## 2018-09-28 PROCEDURE — 81000 URINALYSIS NONAUTO W/SCOPE: CPT | Mod: PO

## 2018-09-28 PROCEDURE — 74176 CT ABD & PELVIS W/O CONTRAST: CPT | Mod: 26,,, | Performed by: RADIOLOGY

## 2018-09-28 NOTE — PROGRESS NOTES
"Subjective:      Patient ID: Severiano Downing is a 68 y.o. female.    Chief Complaint: Hematuria    HPI  Hematuria: Patient complains of gross hematuria. Onset of hematuria was 6 months ago and was sudden in onset. There is a history of nephrolithiasis but this was 20 years ago. There is not a history of urologic trauma. She did have her bladder "streteched" when she was young.  Other urologic symptoms include nothing. Patient admits to history of  surgeries. She has had a little left flank pain.      Health Maintenance Due   Topic Date Due    TETANUS VACCINE  07/11/1967    Pneumococcal (65+) (2 of 2 - PCV13) 03/23/2016    Lipid Panel  06/26/2018    Influenza Vaccine  08/01/2018    Mammogram  12/01/2018       Past Medical History:  Past Medical History:   Diagnosis Date    Anticoagulant long-term use     plavix    Anxiety state 9/28/2010    CAD (coronary artery disease)     1 stent    Depression     Dyslipidemia     GERD (gastroesophageal reflux disease)     Hypothyroidism 8/17/2012    Osteopenia 2013    repeat dexa in 2015    S/P coronary artery stent placement     Thyroid disease     Vitamin D deficiency      Past Surgical History:   Procedure Laterality Date    CARDIAC SURGERY      PCI 2012    CHOLECYSTECTOMY      COLONOSCOPY  ~2000    ([place]).    COLONOSCOPY N/A 4/4/2018    Procedure: COLONOSCOPY;  Surgeon: Gus Chairez Jr., MD;  Location: [facility];  Service: Endoscopy;  Laterality: N/A;    COLONOSCOPY N/A 4/4/2018    Performed by Gus [facility]z Jr., MD at [facility]    COLONOSCOPY W/ POLYPECTOMY  10/16/2007    [facility].   One 2 mm polyp in the ascending colon.  A few sigmoid diverticula.    CORONARY ANGIOPLASTY WITH STENT PLACEMENT  2012    ECTOPIC PREGNANCY SURGERY Left     EGD (ESOPHAGOGASTRODUODENOSCOPY) N/A 7/23/2018    Performed by Gus [facility] Jr., MD at [facility]    ESOPHAGOGASTRODUODENOSCOPY  09/16/2008    [facility].   Gaping LES.  Hiatal hernia.  NERD.  " Moderate antritis.    ESOPHAGOGASTRODUODENOSCOPY  11/29/2011    [facility].   Chronic GERD with no sign of Olguin's.  Hiatal hernia.  Normal stomach and duodenum.    ESOPHAGOGASTRODUODENOSCOPY N/A 7/23/2018    Procedure: EGD (ESOPHAGOGASTRODUODENOSCOPY);  Surgeon: Gus Chairez Jr., MD;  [facility];  Service: Endoscopy;  Laterality: N/A;    MINI-LAPAROTOMY      unilateral oopherectomy with salpingectomy for ECTOPIC pregnancy     Review of patient's allergies indicates:   Allergen Reactions    Iodine and iodide containing products Anaphylaxis    Ativan [lorazepam] Other (See Comments)     Pt becomes overly sedated    Avelox [moxifloxacin] Nausea Only    Codeine Nausea Only    Doxycycline Rash    Sulfa (sulfonamide antibiotics) Rash     No current facility-administered medications on file prior to visit.      Current Outpatient Medications on File Prior to Visit   Medication Sig Dispense Refill    atorvastatin (LIPITOR) 10 MG tablet Take 1 tablet (10 mg total) by mouth once daily. 90 tablet 3    cetirizine (ZYRTEC) 10 MG tablet Take 10 mg by mouth once daily.       clopidogrel (PLAVIX) 75 mg tablet TAKE 1 TABLET ONE TIME DAILY 90 tablet 3    esomeprazole (NEXIUM) 20 MG capsule Take 20 mg by mouth before breakfast.      fluticasone (FLONASE) 50 mcg/actuation nasal spray 2 SPRAYS IN EACH NOSTRIL ONCE DAILY. 16 mL 0    Lactobacillus rhamnosus GG (CULTURELLE) 10 billion cell capsule Take 1 capsule by mouth once daily.      levothyroxine (SYNTHROID) 75 MCG tablet TAKE 1 TABLET ONE TIME DAILY 90 tablet 2    melatonin 5 mg Tab Take by mouth every evening.       multivitamin capsule Take 1 capsule by mouth once daily.        ranitidine (ZANTAC) 300 MG tablet Take 1 tablet (300 mg total) by mouth every evening. , about 30-60 minutes before bedtime. 90 tablet 3    sertraline (ZOLOFT) 50 MG tablet Take 1 tablet (50 mg total) by mouth once daily. (Patient taking differently: Take 25 mg by mouth once  daily. ) 90 tablet 1    tiZANidine (ZANAFLEX) 4 MG tablet TAKE 1 TABLET (4 MG TOTAL) BY MOUTH EVERY 6 (SIX) HOURS AS NEEDED. 90 tablet 2    VITAMIN D2 50,000 unit capsule TAKE 1 CAPSULE BY MOUTH TWICE A WEEK. 24 capsule 0    zolpidem (AMBIEN) 5 MG Tab Take 1 tablet (5 mg total) by mouth nightly as needed. 90 tablet 1    [DISCONTINUED] carvedilol (COREG) 3.125 MG tablet TAKE 1 TABLET BY MOUTH 2 TIMES DAILY WITH MEALS. 180 tablet 1     Social History     Socioeconomic History    Marital status:      Spouse name: Not on file    Number of children: Not on file    Years of education: Not on file    Highest education level: Not on file   Social Needs    Financial resource strain: Not on file    Food insecurity - worry: Not on file    Food insecurity - inability: Not on file    Transportation needs - medical: Not on file    Transportation needs - non-medical: Not on file   Occupational History    Not on file   Tobacco Use    Smoking status: Former Smoker     Types: Cigarettes    Smokeless tobacco: Never Used    Tobacco comment: quit 35 years ago   Substance and Sexual Activity    Alcohol use: Yes     Alcohol/week: 0.6 oz     Types: 1 Glasses of wine per week     Comment:  2 times week    Drug use: No    Sexual activity: No     Birth control/protection: Post-menopausal   Other Topics Concern    Not on file   Social History Narrative    Not on file     Family History   Problem Relation Age of Onset    Cancer Mother     Heart disease Mother     Hyperlipidemia Mother     Hypertension Mother     Breast cancer Mother     Cancer Father     Heart disease Father     Hyperlipidemia Father     Hypertension Father     Cancer Sister     Ovarian cancer Sister              Review of Systems   Constitutional: Positive for fatigue. Negative for fever.   Respiratory: Negative for cough and shortness of breath.    Cardiovascular: Negative for chest pain and palpitations.   Genitourinary: Positive for  "hematuria. Negative for dysuria.       Objective:   BP (!) 142/76   Pulse 71   Temp 98 °F (36.7 °C) (Oral)   Ht 5' 2" (1.575 m)   Wt 83.5 kg (184 lb)   BMI 33.65 kg/m²     Physical Exam   Constitutional: She appears well-developed and well-nourished. She is cooperative.   HENT:   Head: Normocephalic and atraumatic.   Right Ear: Tympanic membrane, external ear and ear canal normal.   Left Ear: Tympanic membrane, external ear and ear canal normal.   Nose: Nose normal.   Mouth/Throat: Uvula is midline and mucous membranes are normal. No oral lesions. No oropharyngeal exudate, posterior oropharyngeal edema or posterior oropharyngeal erythema.   Eyes: EOM and lids are normal. Pupils are equal, round, and reactive to light. Right eye exhibits no discharge. Left eye exhibits no discharge. Right conjunctiva is not injected. Right conjunctiva has no hemorrhage. Left conjunctiva is not injected. Left conjunctiva has no hemorrhage. No scleral icterus. Right eye exhibits no nystagmus. Left eye exhibits no nystagmus.   Neck: Normal range of motion and full passive range of motion without pain. Neck supple. No JVD present. No tracheal tenderness present. Carotid bruit is not present. No tracheal deviation present. No thyroid mass and no thyromegaly present.   Cardiovascular: Normal rate, regular rhythm, S1 normal and S2 normal.   No murmur heard.  Pulses:       Carotid pulses are 2+ on the right side, and 2+ on the left side.       Radial pulses are 2+ on the right side, and 2+ on the left side.        Posterior tibial pulses are 2+ on the right side, and 2+ on the left side.   Pulmonary/Chest: Effort normal and breath sounds normal. No respiratory distress. She has no wheezes. She has no rhonchi. She has no rales.   Abdominal: Soft. Normal appearance, normal aorta and bowel sounds are normal. She exhibits no distension, no abdominal bruit, no pulsatile midline mass and no mass. There is no hepatosplenomegaly. There is no " tenderness. There is no rebound.   Musculoskeletal:        Right knee: She exhibits no swelling. No tenderness found.        Left knee: She exhibits no swelling. No tenderness found.   Lymphadenopathy:        Head (right side): No submental and no submandibular adenopathy present.        Head (left side): No submental and no submandibular adenopathy present.     She has no cervical adenopathy.   Neurological: She is alert. She has normal strength. No cranial nerve deficit or sensory deficit.   Skin: Skin is warm and dry. No rash noted. No cyanosis. Nails show no clubbing.   Psychiatric: She has a normal mood and affect. Her speech is normal and behavior is normal. Thought content normal. Cognition and memory are normal.       Assessment:     1. Hematuria, unspecified type    2. Gross hematuria        Plan:   Severiano was seen today for hematuria.    Diagnoses and all orders for this visit:    Hematuria, unspecified type  -     Urinalysis; Future  -     Urinalysis  -     CT Renal Stone Study ABD Pelvis WO; Future  -     Ambulatory referral to Urology    Gross hematuria  -     CT Renal Stone Study ABD Pelvis WO; Future  -     Ambulatory referral to Urology    Other orders  -     Urinalysis Microscopic

## 2018-10-01 RX ORDER — CARVEDILOL 3.12 MG/1
TABLET ORAL
Qty: 180 TABLET | Refills: 1 | Status: SHIPPED | OUTPATIENT
Start: 2018-10-01 | End: 2019-08-14 | Stop reason: SDUPTHER

## 2018-10-01 NOTE — PROGRESS NOTES
There is a kidney stone in the right kidney however it is not obstructing the flow of the urine and because of that, this should not be causing any symptoms.  If it ever moved in to the ureter, it could be symptomatic at that point.  At this point, because of the blood in the urine, a visit with the urologist needs to be done.  Please confirm that an appointment has been made so that further workup can be completed by that physician.

## 2018-10-03 ENCOUNTER — INITIAL CONSULT (OUTPATIENT)
Dept: UROLOGY | Facility: CLINIC | Age: 68
End: 2018-10-03
Payer: MEDICARE

## 2018-10-03 VITALS
SYSTOLIC BLOOD PRESSURE: 130 MMHG | HEART RATE: 75 BPM | WEIGHT: 186.5 LBS | HEIGHT: 62 IN | BODY MASS INDEX: 34.32 KG/M2 | DIASTOLIC BLOOD PRESSURE: 83 MMHG

## 2018-10-03 DIAGNOSIS — R31.1 BENIGN ESSENTIAL MICROSCOPIC HEMATURIA: Primary | ICD-10-CM

## 2018-10-03 DIAGNOSIS — R31.0 GROSS HEMATURIA: ICD-10-CM

## 2018-10-03 LAB
BILIRUB SERPL-MCNC: NORMAL MG/DL
BLOOD URINE, POC: NORMAL
COLOR, POC UA: NORMAL
GLUCOSE UR QL STRIP: NORMAL
KETONES UR QL STRIP: NORMAL
LEUKOCYTE ESTERASE URINE, POC: NORMAL
NITRITE, POC UA: NORMAL
PH, POC UA: 6
PROTEIN, POC: 30
SPECIFIC GRAVITY, POC UA: 1.01
UROBILINOGEN, POC UA: 0.2

## 2018-10-03 PROCEDURE — 88112 CYTOPATH CELL ENHANCE TECH: CPT | Performed by: PATHOLOGY

## 2018-10-03 PROCEDURE — 99999 PR PBB SHADOW E&M-EST. PATIENT-LVL III: CPT | Mod: PBBFAC,,, | Performed by: UROLOGY

## 2018-10-03 PROCEDURE — 99213 OFFICE O/P EST LOW 20 MIN: CPT | Mod: PBBFAC,PO,25 | Performed by: UROLOGY

## 2018-10-03 PROCEDURE — 99204 OFFICE O/P NEW MOD 45 MIN: CPT | Mod: S$PBB,,, | Performed by: UROLOGY

## 2018-10-03 PROCEDURE — 81002 URINALYSIS NONAUTO W/O SCOPE: CPT | Mod: PBBFAC,PO | Performed by: UROLOGY

## 2018-10-03 NOTE — LETTER
October 3, 2018      Kian Belcher MD  [address] [address]           [facility]  [address] [facility] [address] [place] [address]  Phone: [phone]          Patient: Severiano Downing   MR Number: [id number]   YOB: 1950   Date of Visit: 10/3/2018       Dear Dr. Kian Belcher:    Thank you for referring Severiano Downing to me for evaluation. Attached you will find relevant portions of my assessment and plan of care.    If you have questions, please do not hesitate to call me. I look forward to following Severiano Downing along with you.    Sincerely,    Chapincito Deras MD    Enclosure  CC:  No Recipients    If you would like to receive this communication electronically, please contact [email or web address] or [phone] to request more information on [employer] Link access.    For providers and/or their staff who would like to refer a patient to [facility], please contact us through our one-stop-shop provider referral line, Merary Carrillo, at [phone].    If you feel you have received this communication in error or would no longer like to receive these types of communications, please e-mail [email or web address]

## 2018-10-03 NOTE — PROGRESS NOTES
UROLOGY [place]  10 3 18    Cc gross hematuria    Age 68, comes in because she has noted blood in the urine off and on since July 2018. Pt says she thought it could be the effect of medication or of some food she was eating.     Nocturia x 1, no pains or burning when voiding, even when she was having blood in her urine. Has had some discomfort on the L flank lately but she also has been driving a long trip and pt feels it could be from that.     Passed a stone in 1994 on her own. Has never been seen in urology before.    A CT scan done by pcp last week showed a 5 mm stone in the R kidney, calyceal.      [facility]    Surgical:  has a past surgical history that includes Cholecystectomy; Mini-laparotomy; Ectopic pregnancy surgery (Left); Coronary angioplasty with stent (2012); Colonoscopy w/ polypectomy (10/16/2007); Colonoscopy (~2000); Cardiac surgery; Esophagogastroduodenoscopy (09/16/2008); Esophagogastroduodenoscopy (11/29/2011); EGD (ESOPHAGOGASTRODUODENOSCOPY) (N/A, 7/23/2018); and COLONOSCOPY (N/A, 4/4/2018).    Medical:  has a past medical history of Anticoagulant long-term use, Anxiety state, CAD (coronary artery disease), Depression, Dyslipidemia, GERD (gastroesophageal reflux disease), Hypothyroidism, Osteopenia, S/P coronary artery stent placement, Thyroid disease, and Vitamin D deficiency.    Familial: father had prostate ca, mother had breast cancer, sister had ovarian cancer and brother had melanoma. Father had kidney stones    Social: , lives in [place]    Meds:   Current Outpatient Medications on File Prior to Visit   Medication Sig Dispense Refill    atorvastatin (LIPITOR) 10 MG tablet Take 1 tablet (10 mg total) by mouth onc 90 tablet 3    carvedilol (COREG) 3.125 MG tablet TAKE 1 TABLET BY MOUTH 2 S. 180 tablet 1    cetirizine (ZYRTEC) 10 MG tablet Take 10 mg by mouth once daily.       clopidogrel (PLAVIX) 75 mg tablet TAKE 1 TABLET ONE TIME DAILY 90 tablet 3    esomeprazole (NEXIUM) 20  MG capsule Take 20 mg by mouth b      fluticasone (FLONASE) 50 mcg/actuation nasal spray 2 SPRAYS IN EACH NOSTRIL  16 mL 0    Lactobacillus rhamnosus GG (CULTURELLE) 10 billion ce Take 1 capsule by mouth once daily.      levothyroxine (SYNTHROID) 75 MCG tablet TAKE 1 TABLET ONE TIME DAILY 90 tablet 2    melatonin 5 mg Tab Take by m      multivitamin capsule Take 1 capsule      ranitidine (ZANTAC) 300 MG tablet Take 1 tablet (300 mg total) by 90 tablet 3    sertraline (ZOLOFT) 50 MG tablet Take 1 tablet (50 mg total) by m 90 tablet 1    tiZANidine (ZANAFLEX) 4 MG tablet TAKE 1 TABLET (4 MG TO 90 tablet 2    TURMERIC ORAL Take by mouth.      VITAMIN D2 50,000 unit capsule TAKE 24 capsule 0    zolpidem (AMBIEN) 5 MG Tab Take 1 tablet ( 90 tablet 1       REVIEW OF SYSTEMS  GENERAL:rare headaches, has some dizzy spells.and last week went to the ER for that.  HEENT: vision Needs glasses. Sinuses: has complaints.   CARDIOPULMONARY: No swelling of the legs; no chest pain. No shortness of breath, no wheezing.   GASTROINTESTINAL: has heartburn. Denies diarrhea; has constipation, no blood or mucus in stools.   GENITOURINARY: has gross hematuria. Denies dysuria, or incontinence.   MUSCULOSKELETAL: has some arthritic complaints such as pain or stiffness.   PSYCHIATRIC: No history of depression or anxiety.   ENDOCRINOLOGIC: No reports of sweating, cold or heat intolerance. No polyuria or polydipsia.   NEUROLOGICAL: No dizziness, syncope, paralysis  LYMPHATICS: No enlarged nodes. No history of splenectomy.  ==    Pt alert, oriented, no distress  HEENT: wnl.  Neck: supple, no JVD, no lymphadenopathy  Chest: CV NSR  Lungs: normal chest expansion, no labored breathing  Abdomen flat, nontender, no organomegaly, no masses.  Extremities: no edema, peripheral pulses nl  Neuro: preserved    IMP    Gross hematuria  Stone in R kidney, non obstructive  Will do cystoscopy, urine cytology, bladderscan

## 2018-10-04 ENCOUNTER — PATIENT MESSAGE (OUTPATIENT)
Dept: FAMILY MEDICINE | Facility: CLINIC | Age: 68
End: 2018-10-04

## 2018-10-04 DIAGNOSIS — R31.0 HEMATURIA, GROSS: Primary | ICD-10-CM

## 2018-10-05 ENCOUNTER — TELEPHONE (OUTPATIENT)
Dept: FAMILY MEDICINE | Facility: CLINIC | Age: 68
End: 2018-10-05

## 2018-10-05 NOTE — TELEPHONE ENCOUNTER
Pt stating she had more blood in the urine yesterday, but has not had any today. No burning or pain when urinating , no fever. Advised that due to dr Deras being out of the office for several weeks later this month, there are no earlier appointments.

## 2018-10-05 NOTE — TELEPHONE ENCOUNTER
I have messaged Dr Deras's staff to see if they can schedule the cystoscope sooner. Awaiting their reply.

## 2018-10-05 NOTE — TELEPHONE ENCOUNTER
Dr Belcher has asked for us to contact Dr Deras to see if the patient can have her cystocope done sooner than 11/13/18 as her symptoms have gotten worse. Please advise.

## 2018-10-08 ENCOUNTER — LAB VISIT (OUTPATIENT)
Dept: LAB | Facility: HOSPITAL | Age: 68
End: 2018-10-08
Attending: FAMILY MEDICINE
Payer: MEDICARE

## 2018-10-08 DIAGNOSIS — R31.0 HEMATURIA, GROSS: ICD-10-CM

## 2018-10-08 LAB
BASOPHILS # BLD AUTO: 0.08 K/UL
BASOPHILS NFR BLD: 1 %
DIFFERENTIAL METHOD: NORMAL
EOSINOPHIL # BLD AUTO: 0.2 K/UL
EOSINOPHIL NFR BLD: 2.6 %
ERYTHROCYTE [DISTWIDTH] IN BLOOD BY AUTOMATED COUNT: 13.6 %
HCT VFR BLD AUTO: 43.1 %
HGB BLD-MCNC: 14.3 G/DL
IMM GRANULOCYTES # BLD AUTO: 0.03 K/UL
IMM GRANULOCYTES NFR BLD AUTO: 0.4 %
LYMPHOCYTES # BLD AUTO: 2.6 K/UL
LYMPHOCYTES NFR BLD: 32.4 %
MCH RBC QN AUTO: 30.7 PG
MCHC RBC AUTO-ENTMCNC: 33.2 G/DL
MCV RBC AUTO: 93 FL
MONOCYTES # BLD AUTO: 0.8 K/UL
MONOCYTES NFR BLD: 9.7 %
NEUTROPHILS # BLD AUTO: 4.3 K/UL
NEUTROPHILS NFR BLD: 53.9 %
NRBC BLD-RTO: 0 /100 WBC
PLATELET # BLD AUTO: 312 K/UL
PMV BLD AUTO: 11.8 FL
RBC # BLD AUTO: 4.66 M/UL
WBC # BLD AUTO: 7.94 K/UL

## 2018-10-08 PROCEDURE — 85025 COMPLETE CBC W/AUTO DIFF WBC: CPT

## 2018-10-08 PROCEDURE — 85730 THROMBOPLASTIN TIME PARTIAL: CPT

## 2018-10-08 PROCEDURE — 85610 PROTHROMBIN TIME: CPT

## 2018-10-08 PROCEDURE — 36415 COLL VENOUS BLD VENIPUNCTURE: CPT | Mod: PO

## 2018-10-08 RX ORDER — LEVOTHYROXINE SODIUM 75 UG/1
TABLET ORAL
Qty: 90 TABLET | Refills: 1 | OUTPATIENT
Start: 2018-10-08

## 2018-10-09 LAB
APTT BLDCRRT: 25.2 SEC
INR PPP: 1
PROTHROMBIN TIME: 10.6 SEC

## 2018-10-18 RX ORDER — LEVOTHYROXINE SODIUM 75 UG/1
TABLET ORAL
Qty: 90 TABLET | Refills: 2 | OUTPATIENT
Start: 2018-10-18

## 2018-10-19 ENCOUNTER — OFFICE VISIT (OUTPATIENT)
Dept: CARDIOLOGY | Facility: CLINIC | Age: 68
End: 2018-10-19
Payer: MEDICARE

## 2018-10-19 VITALS
SYSTOLIC BLOOD PRESSURE: 134 MMHG | WEIGHT: 185.44 LBS | HEIGHT: 62 IN | BODY MASS INDEX: 34.12 KG/M2 | DIASTOLIC BLOOD PRESSURE: 79 MMHG | HEART RATE: 77 BPM

## 2018-10-19 DIAGNOSIS — I25.10 CORONARY ARTERY DISEASE INVOLVING NATIVE CORONARY ARTERY OF NATIVE HEART WITHOUT ANGINA PECTORIS: ICD-10-CM

## 2018-10-19 DIAGNOSIS — I10 ESSENTIAL HYPERTENSION: ICD-10-CM

## 2018-10-19 DIAGNOSIS — E78.00 HYPERCHOLESTEROLEMIA: ICD-10-CM

## 2018-10-19 DIAGNOSIS — I25.10 CORONARY ARTERY DISEASE, ANGINA PRESENCE UNSPECIFIED, UNSPECIFIED VESSEL OR LESION TYPE, UNSPECIFIED WHETHER NATIVE OR TRANSPLANTED HEART: ICD-10-CM

## 2018-10-19 DIAGNOSIS — R06.02 SOB (SHORTNESS OF BREATH): Primary | ICD-10-CM

## 2018-10-19 DIAGNOSIS — Z95.5 S/P CORONARY ARTERY STENT PLACEMENT: Primary | ICD-10-CM

## 2018-10-19 DIAGNOSIS — E78.5 DYSLIPIDEMIA: ICD-10-CM

## 2018-10-19 PROCEDURE — 99214 OFFICE O/P EST MOD 30 MIN: CPT | Mod: S$PBB,,, | Performed by: INTERNAL MEDICINE

## 2018-10-19 PROCEDURE — 99999 PR PBB SHADOW E&M-EST. PATIENT-LVL III: CPT | Mod: PBBFAC,,, | Performed by: INTERNAL MEDICINE

## 2018-10-19 PROCEDURE — 99213 OFFICE O/P EST LOW 20 MIN: CPT | Mod: PBBFAC,PO | Performed by: INTERNAL MEDICINE

## 2018-10-19 NOTE — PROGRESS NOTES
Subjective:    Patient ID:  Severiano Downing is a 68 y.o. female who presents for follow-up of cad    HPI    Chest pain/tightness with exertion for the last few weeks.    Review of Systems   Constitution: Negative for decreased appetite, weakness, malaise/fatigue, weight gain and weight loss.   Cardiovascular: Negative for chest pain, dyspnea on exertion, leg swelling, palpitations and syncope.   Respiratory: Negative for cough and shortness of breath.    Gastrointestinal: Negative.    All other systems reviewed and are negative.       Objective:      Physical Exam   Constitutional: She is oriented to person, place, and time. She appears well-developed and well-nourished.   HENT:   Head: Normocephalic.   Eyes: Pupils are equal, round, and reactive to light.   Neck: Normal range of motion. Neck supple. No JVD present. Carotid bruit is not present. No thyromegaly present.   Cardiovascular: Normal rate, regular rhythm, normal heart sounds, intact distal pulses and normal pulses. PMI is not displaced. Exam reveals no gallop.   No murmur heard.  Pulmonary/Chest: Effort normal and breath sounds normal.   Abdominal: Soft. Normal appearance. She exhibits no mass. There is no hepatosplenomegaly. There is no tenderness.   Musculoskeletal: Normal range of motion. She exhibits no edema.   Neurological: She is alert and oriented to person, place, and time. She has normal strength and normal reflexes. No sensory deficit.   Skin: Skin is warm and intact.   Psychiatric: She has a normal mood and affect.   Nursing note and vitals reviewed.        Assessment:       1. S/P coronary artery stent placement    2. Coronary artery disease involving native coronary artery of native heart without angina pectoris    3. Hypercholesterolemia    4. Dyslipidemia    5. Essential hypertension         Plan:     [facility] +/-  Radial access

## 2018-10-19 NOTE — PATIENT INSTRUCTIONS
Angiogram     Arrive for procedure at: [facility] will call with arrival time for angiogram 10/29/18 at 10am with     You will receive a phone call from [facility] Pre-Op Department with further instructions prior to your scheduled procedure.    Notify the nurse if you are ALLERGIC TO IODINE.    FASTING: You MAY NOT have anything to eat or drink AFTER MIDNIGHT the day before your procedure. If your procedure is scheduled in the afternoon, you may have a LIGHT BREAKFAST 6-8 hours prior to your procedure.  For example: Two slices of toast; black coffee or black tea.    MEDICATIONS: You may take your regular morning medications with water. If there are any medications that you should not take, you will be instructed to hold them for that morning.    ? CARDIOLOGY PRE-PROCEDURE MEDICATION ORDERS:  ** Please hold any medications that are checked below:    HOLD   # OF DAYS TO HOLD  ? Coumadin   Consult with Coumadin Clinic   ? Xarelto    _DAY BEFORE & DAY OF_  ? Pradaxa  _ DAY BEFORE & DAY OF _  ? Eliquis   _ DAY BEFORE & DAY OF _  ? Metformin    Day before procedure & morning of procedure  ? Short acting insulin   Morning of procedure    CONTINUE the Following Medications   ? Plavix      ? Effient     ? Aspirin    WHAT TO EXPECT:    How long will the procedure take?  The procedure will take an average of 1 - 2 hours to perform.  After the procedure, you will need to lay flat for around 4 - 6 hours to minimize bleeding from the puncture site. If the wrist is accessed you will need to keep your arm still as instructed by the nurse.    When can I go home?  You may be able to be discharged home that same afternoon if there were no complications.  If you have one of the following: balloon; stent; pacemaker or defibrillator procedures, you may spend one night for observation.  Your doctor will determine your discharge based upon your progress.  The results of your procedure will be discussed with you  before you are discharged.  Any further testing or procedures will be scheduled for you either before you leave or you will be instructed to call for a future appointment.      TRANSPORTATION:  PLEASE ARRANGE TO HAVE SOMEONE DRIVE YOU HOME FOLLOWING YOUR PROCEDURE, YOU WILL NOT BE ALLOWED TO DRIVE.

## 2018-10-25 ENCOUNTER — PES CALL (OUTPATIENT)
Dept: ADMINISTRATIVE | Facility: CLINIC | Age: 68
End: 2018-10-25

## 2018-10-29 PROBLEM — R06.02 SOB (SHORTNESS OF BREATH): Status: ACTIVE | Noted: 2018-10-29

## 2018-10-29 RX ORDER — ERGOCALCIFEROL 1.25 MG/1
CAPSULE ORAL
Qty: 24 CAPSULE | Refills: 0 | Status: SHIPPED | OUTPATIENT
Start: 2018-10-29 | End: 2019-01-25 | Stop reason: SDUPTHER

## 2018-10-30 ENCOUNTER — TELEPHONE (OUTPATIENT)
Dept: CARDIOLOGY | Facility: CLINIC | Age: 68
End: 2018-10-30

## 2018-10-30 NOTE — TELEPHONE ENCOUNTER
----- Message from Phyllis Knight sent at 10/30/2018 10:36 AM CDT -----  Type:  Sooner Apoointment Request    Caller is requesting a sooner appointment.  Caller declined first available appointment listed below.  Caller will not accept being placed on the waitlist and is requesting a message be sent to doctor.    Name of Caller: Patient  When is the first available appointment? 11/16  Symptoms:  2 week post op/stint put in  Best Call Back Number:  [phone] (home)     Additional Information:  Na

## 2018-11-03 ENCOUNTER — TELEPHONE (OUTPATIENT)
Dept: FAMILY MEDICINE | Facility: CLINIC | Age: 68
End: 2018-11-03

## 2018-11-03 NOTE — TELEPHONE ENCOUNTER
----- Message from Robby Tomasz sent at 11/3/2018  8:52 AM CDT -----  Contact: self [phone]  Pt has been having rt wrist pain since her angiogram this week. Pt says that the pain is continuous and she wants to know if someone can look at it today/Angiogram was ordered by Dr Garnett pls call/thanks.

## 2018-11-03 NOTE — TELEPHONE ENCOUNTER
Patient reports angiogram done via wrist , c/o severe pain, instructed to contact cardiologist that did procedure for advise, or go to ER, in case any testing would need to be done.  Patient verbalized understanding..

## 2018-11-07 ENCOUNTER — PATIENT OUTREACH (OUTPATIENT)
Dept: ADMINISTRATIVE | Facility: HOSPITAL | Age: 68
End: 2018-11-07

## 2018-11-07 NOTE — PROGRESS NOTES
Spoke with pt to ensure to coming fasting for labs on tomorrow due to pt overdue for lipid panel screening.

## 2018-11-08 ENCOUNTER — LAB VISIT (OUTPATIENT)
Dept: LAB | Facility: HOSPITAL | Age: 68
End: 2018-11-08
Attending: FAMILY MEDICINE
Payer: MEDICARE

## 2018-11-08 DIAGNOSIS — I25.10 CORONARY ARTERY DISEASE INVOLVING NATIVE CORONARY ARTERY OF NATIVE HEART WITHOUT ANGINA PECTORIS: ICD-10-CM

## 2018-11-08 DIAGNOSIS — Z95.5 S/P CORONARY ARTERY STENT PLACEMENT: ICD-10-CM

## 2018-11-08 DIAGNOSIS — I10 ESSENTIAL HYPERTENSION: ICD-10-CM

## 2018-11-08 DIAGNOSIS — E78.5 DYSLIPIDEMIA: ICD-10-CM

## 2018-11-08 DIAGNOSIS — R00.2 PALPITATIONS: ICD-10-CM

## 2018-11-08 DIAGNOSIS — R73.01 IMPAIRED FASTING GLUCOSE: ICD-10-CM

## 2018-11-08 DIAGNOSIS — Z87.898 HISTORY OF PREDIABETES: ICD-10-CM

## 2018-11-08 LAB
CHOLEST SERPL-MCNC: 184 MG/DL
CHOLEST/HDLC SERPL: 3.9 {RATIO}
ESTIMATED AVG GLUCOSE: 131 MG/DL
GLUCOSE SERPL-MCNC: 152 MG/DL
HBA1C MFR BLD HPLC: 6.2 %
HDLC SERPL-MCNC: 47 MG/DL
HDLC SERPL: 25.5 %
LDLC SERPL CALC-MCNC: 106.8 MG/DL
NONHDLC SERPL-MCNC: 137 MG/DL
TRIGL SERPL-MCNC: 151 MG/DL

## 2018-11-08 PROCEDURE — 82947 ASSAY GLUCOSE BLOOD QUANT: CPT

## 2018-11-08 PROCEDURE — 83036 HEMOGLOBIN GLYCOSYLATED A1C: CPT

## 2018-11-08 PROCEDURE — 36415 COLL VENOUS BLD VENIPUNCTURE: CPT | Mod: PO

## 2018-11-08 PROCEDURE — 80061 LIPID PANEL: CPT

## 2018-11-09 ENCOUNTER — TELEPHONE (OUTPATIENT)
Dept: FAMILY MEDICINE | Facility: CLINIC | Age: 68
End: 2018-11-09

## 2018-11-09 ENCOUNTER — PATIENT MESSAGE (OUTPATIENT)
Dept: CARDIOLOGY | Facility: CLINIC | Age: 68
End: 2018-11-09

## 2018-11-09 DIAGNOSIS — I25.10 CORONARY ARTERY DISEASE, ANGINA PRESENCE UNSPECIFIED, UNSPECIFIED VESSEL OR LESION TYPE, UNSPECIFIED WHETHER NATIVE OR TRANSPLANTED HEART: Primary | ICD-10-CM

## 2018-11-09 DIAGNOSIS — R73.9 HYPERGLYCEMIA: Primary | ICD-10-CM

## 2018-11-09 NOTE — TELEPHONE ENCOUNTER
----- Message from Ruslan Moore NP sent at 11/9/2018  8:50 AM CST -----  Fasting glucose is elevated.  Last month fasting glucose was only 91 so this is good.  We are still in the prediabetic range and I recommend avoiding sugary foods and drinks a decreasing carbohydrate intake.  I also recommend an overall healthy diet associated with exercise to help reduce progression this to diabetes.  We will re-evaluate in 6 months and will do a 2 hr glucose tolerance test at that time.

## 2018-11-12 ENCOUNTER — PATIENT MESSAGE (OUTPATIENT)
Dept: CARDIOLOGY | Facility: CLINIC | Age: 68
End: 2018-11-12

## 2018-11-13 ENCOUNTER — OFFICE VISIT (OUTPATIENT)
Dept: CARDIOLOGY | Facility: CLINIC | Age: 68
End: 2018-11-13
Payer: MEDICARE

## 2018-11-13 ENCOUNTER — PROCEDURE VISIT (OUTPATIENT)
Dept: UROLOGY | Facility: CLINIC | Age: 68
End: 2018-11-13
Payer: MEDICARE

## 2018-11-13 VITALS
HEIGHT: 62 IN | WEIGHT: 186.31 LBS | SYSTOLIC BLOOD PRESSURE: 125 MMHG | BODY MASS INDEX: 34.29 KG/M2 | DIASTOLIC BLOOD PRESSURE: 70 MMHG | HEART RATE: 72 BPM

## 2018-11-13 VITALS
SYSTOLIC BLOOD PRESSURE: 129 MMHG | DIASTOLIC BLOOD PRESSURE: 80 MMHG | HEIGHT: 60 IN | HEART RATE: 73 BPM | WEIGHT: 186.31 LBS | BODY MASS INDEX: 36.58 KG/M2

## 2018-11-13 DIAGNOSIS — E78.5 DYSLIPIDEMIA: ICD-10-CM

## 2018-11-13 DIAGNOSIS — N20.0 KIDNEY STONE ON RIGHT SIDE: ICD-10-CM

## 2018-11-13 DIAGNOSIS — R31.0 GROSS HEMATURIA: ICD-10-CM

## 2018-11-13 DIAGNOSIS — I10 ESSENTIAL HYPERTENSION: ICD-10-CM

## 2018-11-13 DIAGNOSIS — R31.1 BENIGN ESSENTIAL MICROSCOPIC HEMATURIA: Primary | ICD-10-CM

## 2018-11-13 DIAGNOSIS — R33.9 INCOMPLETE BLADDER EMPTYING: ICD-10-CM

## 2018-11-13 DIAGNOSIS — Z95.5 S/P CORONARY ARTERY STENT PLACEMENT: ICD-10-CM

## 2018-11-13 DIAGNOSIS — I25.10 CORONARY ARTERY DISEASE INVOLVING NATIVE CORONARY ARTERY OF NATIVE HEART WITHOUT ANGINA PECTORIS: Primary | ICD-10-CM

## 2018-11-13 LAB
BILIRUB SERPL-MCNC: ABNORMAL MG/DL
BLOOD URINE, POC: ABNORMAL
COLOR, POC UA: ABNORMAL
GLUCOSE UR QL STRIP: ABNORMAL
KETONES UR QL STRIP: ABNORMAL
LEUKOCYTE ESTERASE URINE, POC: ABNORMAL
NITRITE, POC UA: ABNORMAL
PH, POC UA: 6
POC RESIDUAL URINE VOLUME: 0 ML (ref 0–100)
PROTEIN, POC: ABNORMAL
SPECIFIC GRAVITY, POC UA: 1020
UROBILINOGEN, POC UA: 0.2

## 2018-11-13 PROCEDURE — 99214 OFFICE O/P EST MOD 30 MIN: CPT | Mod: S$PBB,,, | Performed by: INTERNAL MEDICINE

## 2018-11-13 PROCEDURE — 52000 CYSTOURETHROSCOPY: CPT | Mod: PBBFAC,PO | Performed by: UROLOGY

## 2018-11-13 PROCEDURE — 52000 CYSTOURETHROSCOPY: CPT | Mod: S$PBB,,, | Performed by: UROLOGY

## 2018-11-13 PROCEDURE — 99213 OFFICE O/P EST LOW 20 MIN: CPT | Mod: PBBFAC,PO,25 | Performed by: INTERNAL MEDICINE

## 2018-11-13 PROCEDURE — 51798 US URINE CAPACITY MEASURE: CPT | Mod: PBBFAC,PO | Performed by: UROLOGY

## 2018-11-13 PROCEDURE — 81002 URINALYSIS NONAUTO W/O SCOPE: CPT | Mod: PBBFAC,PO | Performed by: UROLOGY

## 2018-11-13 PROCEDURE — 99999 PR PBB SHADOW E&M-EST. PATIENT-LVL III: CPT | Mod: PBBFAC,,, | Performed by: INTERNAL MEDICINE

## 2018-11-13 NOTE — PROGRESS NOTES
UROLOGY [place]  11 13 18    Cc gross hematuria      Age 68, here today for urologic w/u for gross hematuria off and on since July 2018.       Nocturia x 1, no pains or burning when voiding     Passed a stone in 1994 on her own. Has never been seen in urology before.     A CT scan done by pcp last week showed a 5 mm stone in the R kidney, calyceal.     CYSTOSCOPY olympus flexible. Urethra normal, with no stricture or diverticulum. Bladder cavity distends symmetrically and equally when distended with water. Mucosal layer with no lesions. No abnormal trabeculation. Location and shape of the ureteral orifices normal. Pt tolerated the procedure well.       BLADDERSCAN ULTRASOUND  0  ml residual    URINE CYTOLOGY no malignant cells       IMP     Gross hematuria  Stone in R kidney, non obstructive  No other abnormalities were found.  I discussed with pt that other studies can still be done. If she has any further gross hematuria, we can do CT urogram.

## 2018-11-13 NOTE — PROGRESS NOTES
Subjective:    Patient ID:  Severiano Downing is a 68 y.o. female who presents for follow-up of cad    HPI    Had OM PCI last month  She comes with no complaints, no chest pain, no shortness of breath      Review of Systems   Constitution: Negative for decreased appetite, weakness, malaise/fatigue, weight gain and weight loss.   Cardiovascular: Negative for chest pain, dyspnea on exertion, leg swelling, palpitations and syncope.   Respiratory: Negative for cough and shortness of breath.    Gastrointestinal: Negative.    All other systems reviewed and are negative.       Objective:      Physical Exam   Constitutional: She is oriented to person, place, and time. She appears well-developed and well-nourished.   HENT:   Head: Normocephalic.   Eyes: Pupils are equal, round, and reactive to light.   Neck: Normal range of motion. Neck supple. No JVD present. Carotid bruit is not present. No thyromegaly present.   Cardiovascular: Normal rate, regular rhythm, normal heart sounds, intact distal pulses and normal pulses. PMI is not displaced. Exam reveals no gallop.   No murmur heard.  Pulmonary/Chest: Effort normal and breath sounds normal.   Abdominal: Soft. Normal appearance. She exhibits no mass. There is no hepatosplenomegaly. There is no tenderness.   Musculoskeletal: Normal range of motion. She exhibits no edema.   Neurological: She is alert and oriented to person, place, and time. She has normal strength and normal reflexes. No sensory deficit.   Skin: Skin is warm and intact.   Psychiatric: She has a normal mood and affect.   Nursing note and vitals reviewed.        Assessment:       1. Coronary artery disease involving native coronary artery of native heart without angina pectoris    2. S/P coronary artery stent placement    3. Dyslipidemia    4. Essential hypertension         Plan:     Continue all cardiac medications  Regular exercise program  Weight loss  6 m f/u

## 2018-11-23 RX ORDER — SERTRALINE HYDROCHLORIDE 50 MG/1
TABLET, FILM COATED ORAL
Qty: 90 TABLET | Refills: 0 | Status: SHIPPED | OUTPATIENT
Start: 2018-11-23 | End: 2018-11-28 | Stop reason: SDUPTHER

## 2018-11-23 NOTE — TELEPHONE ENCOUNTER
----- Message from Analisa Espinoza sent at 11/23/2018 10:20 AM CST -----  Contact: [facility]-[phone]  Would like to consult with the nurse patient need refill on RX medication. Sertraline 50 mg. Please call back at [phone]. Thanks sj                Pt use.  ..  [facility]/pharmacy #[id number] - [place], [place] - [address] [address]  Phone: [phone] Fax: [phone]

## 2018-11-28 RX ORDER — SERTRALINE HYDROCHLORIDE 50 MG/1
50 TABLET, FILM COATED ORAL DAILY
Qty: 90 TABLET | Refills: 11 | Status: SHIPPED | OUTPATIENT
Start: 2018-11-28 | End: 2019-11-15

## 2018-12-14 RX ORDER — TIZANIDINE 4 MG/1
TABLET ORAL
Qty: 90 TABLET | Refills: 0 | Status: SHIPPED | OUTPATIENT
Start: 2018-12-14 | End: 2019-01-08 | Stop reason: SDUPTHER

## 2019-01-08 RX ORDER — TIZANIDINE 4 MG/1
TABLET ORAL
Qty: 90 TABLET | Refills: 0 | Status: SHIPPED | OUTPATIENT
Start: 2019-01-08 | End: 2019-11-15

## 2019-01-18 RX ORDER — CLOPIDOGREL BISULFATE 75 MG/1
TABLET ORAL
Qty: 90 TABLET | Refills: 3 | Status: SHIPPED | OUTPATIENT
Start: 2019-01-18 | End: 2019-10-15 | Stop reason: SDUPTHER

## 2019-01-25 RX ORDER — ERGOCALCIFEROL 1.25 MG/1
CAPSULE ORAL
Qty: 24 CAPSULE | Refills: 0 | Status: SHIPPED | OUTPATIENT
Start: 2019-01-25 | End: 2019-04-18 | Stop reason: SDUPTHER

## 2019-01-31 ENCOUNTER — EXTERNAL CHRONIC CARE MANAGEMENT (OUTPATIENT)
Dept: PRIMARY CARE CLINIC | Facility: CLINIC | Age: 69
End: 2019-01-31
Payer: MEDICARE

## 2019-01-31 PROCEDURE — 99490 PR CHRONIC CARE MGMT, 1ST 20 MIN: ICD-10-PCS | Mod: S$GLB,,, | Performed by: FAMILY MEDICINE

## 2019-01-31 PROCEDURE — 99490 CHRNC CARE MGMT STAFF 1ST 20: CPT | Mod: S$GLB,,, | Performed by: FAMILY MEDICINE

## 2019-02-11 ENCOUNTER — TELEPHONE (OUTPATIENT)
Dept: FAMILY MEDICINE | Facility: CLINIC | Age: 69
End: 2019-02-11

## 2019-02-11 NOTE — TELEPHONE ENCOUNTER
----- Message from Robby Tolbert sent at 2/11/2019  1:18 PM CST -----  Contact: self [phone]  Pt would like to know if she could have an order put in to get a measles vaccine/pls call/thanks.

## 2019-02-11 NOTE — TELEPHONE ENCOUNTER
Please ask her to check her facebook notifications for a message from me to connect with another lady who wants to ask her some questions about assisted living.     I have signed for the following orders AND/OR meds.  Please call the patient and ask the patient to schedule the testing AND/OR inform about any medications that were sent.      Orders Placed This Encounter   Procedures    (In Office Administered) MMR Vaccine (SQ)

## 2019-02-11 NOTE — TELEPHONE ENCOUNTER
----- Message from Robby Tolbert sent at 2/11/2019  1:18 PM CST -----  Contact: self [phone]  Pt would like to know if she could have an order put in to get a measles vaccine/pls call/thanks.

## 2019-02-11 NOTE — TELEPHONE ENCOUNTER
Called pt and scheduled mammo.   Spoke with pt. Informed pt Dr. Cummins sent her rx for synthroid to the pharmacy and she would like more blood work done in 6 weeks. Pt stated she won't be doing either because she wants to switch doctors. Stated she was unhappy about the dx anhedonia that was put on her AVS.

## 2019-02-12 ENCOUNTER — CLINICAL SUPPORT (OUTPATIENT)
Dept: FAMILY MEDICINE | Facility: CLINIC | Age: 69
End: 2019-02-12
Payer: MEDICARE

## 2019-02-12 DIAGNOSIS — Z23 IMMUNIZATION DUE: Primary | ICD-10-CM

## 2019-02-12 PROCEDURE — 99999 PR PBB SHADOW E&M-EST. PATIENT-LVL II: ICD-10-PCS | Mod: PBBFAC,,,

## 2019-02-12 PROCEDURE — 90471 IMMUNIZATION ADMIN: CPT | Mod: PBBFAC,PO

## 2019-02-12 PROCEDURE — 99999 PR PBB SHADOW E&M-EST. PATIENT-LVL II: CPT | Mod: PBBFAC,,,

## 2019-02-12 PROCEDURE — 90707 MMR VACCINE SC: CPT | Mod: PBBFAC,PO

## 2019-02-12 PROCEDURE — 99212 OFFICE O/P EST SF 10 MIN: CPT | Mod: PBBFAC,25,PO

## 2019-02-12 NOTE — PROGRESS NOTES
Patient here for MMR vaccine, given to left deltoid, patient advised to wait in waiting room 15 min immunization time, patient verbalized understanding of this, patient tolerated well.  
Nutrition Follow-Up Note/Nutrition Services

## 2019-02-13 ENCOUNTER — TELEPHONE (OUTPATIENT)
Dept: FAMILY MEDICINE | Facility: CLINIC | Age: 69
End: 2019-02-13

## 2019-02-13 DIAGNOSIS — R31.9 HEMATURIA, UNSPECIFIED TYPE: Primary | ICD-10-CM

## 2019-02-13 NOTE — TELEPHONE ENCOUNTER
Patient seen Dr Deras for blood in urine  She had one test done but was told it was normal if it continued she would need a scope.  She is starting to have the bleeding again but doesn't want to follow up with Dr Deras since she was not happy with his care.  She is asking if you could please recommend someone else

## 2019-02-13 NOTE — TELEPHONE ENCOUNTER
Call her and let her know I recommend Dr. Velasquez in [place].     Also, ask her to check her facebook messenger as I sent her a message about someone who would like to connect with her to discuss long term care facilities.  I had suggested that they meet.  If she would like her #, I can give her that.     I have signed for the following orders AND/OR meds.  Please call the patient and ask the patient to schedule the testing AND/OR inform about any medications that were sent.      Orders Placed This Encounter   Procedures    Ambulatory referral to Urology     Referral Priority:   Routine     Referral Type:   Consultation     Referral Reason:   Specialty Services Required     Referred to Provider:   Jack Velasquez IV, MD     Requested Specialty:   Urology     Number of Visits Requested:   1

## 2019-02-13 NOTE — TELEPHONE ENCOUNTER
----- Message from Amy Reis sent at 2/13/2019 12:10 PM CST -----  Contact: self  Type:  Needs Medical Advice    Who Called: MS. REED  Symptoms (please be specific): BLOOD IN URINE   How long has patient had these symptoms:  1 WEEK  Pharmacy name and phone #:    [facility] #[id number] - [place], [place] - [address] [address]  Phone: [phone] Fax: [phone]    Would the patient rather a call back or a response via [facility]? CALL  Best Call Back Number: [phone]  Additional Information: PATIENT NEEDS TO KNOW WHAT TO DO NEXT. PLEASE GIVE PATIENT A CALL BEFORE PRESCRIBING MEDS.

## 2019-02-28 ENCOUNTER — EXTERNAL CHRONIC CARE MANAGEMENT (OUTPATIENT)
Dept: PRIMARY CARE CLINIC | Facility: CLINIC | Age: 69
End: 2019-02-28
Payer: MEDICARE

## 2019-02-28 PROCEDURE — 99490 PR CHRONIC CARE MGMT, 1ST 20 MIN: ICD-10-PCS | Mod: S$GLB,,, | Performed by: FAMILY MEDICINE

## 2019-02-28 PROCEDURE — 99490 CHRNC CARE MGMT STAFF 1ST 20: CPT | Mod: S$GLB,,, | Performed by: FAMILY MEDICINE

## 2019-03-10 ENCOUNTER — PATIENT MESSAGE (OUTPATIENT)
Dept: FAMILY MEDICINE | Facility: CLINIC | Age: 69
End: 2019-03-10

## 2019-03-11 ENCOUNTER — PATIENT MESSAGE (OUTPATIENT)
Dept: FAMILY MEDICINE | Facility: CLINIC | Age: 69
End: 2019-03-11

## 2019-03-14 ENCOUNTER — CLINICAL SUPPORT (OUTPATIENT)
Dept: CARDIOLOGY | Facility: CLINIC | Age: 69
End: 2019-03-14
Payer: MEDICARE

## 2019-03-14 ENCOUNTER — HOSPITAL ENCOUNTER (OUTPATIENT)
Dept: RADIOLOGY | Facility: HOSPITAL | Age: 69
Discharge: HOME | End: 2019-03-14
Attending: UROLOGY
Payer: MEDICARE

## 2019-03-14 ENCOUNTER — TELEPHONE (OUTPATIENT)
Dept: UROLOGY | Facility: CLINIC | Age: 69
End: 2019-03-14

## 2019-03-14 ENCOUNTER — OFFICE VISIT (OUTPATIENT)
Dept: UROLOGY | Facility: CLINIC | Age: 69
End: 2019-03-14
Payer: MEDICARE

## 2019-03-14 VITALS — HEIGHT: 62 IN | BODY MASS INDEX: 34.48 KG/M2 | WEIGHT: 187.38 LBS

## 2019-03-14 DIAGNOSIS — N20.0 RENAL STONE: ICD-10-CM

## 2019-03-14 DIAGNOSIS — N20.0 RENAL STONE: Primary | ICD-10-CM

## 2019-03-14 LAB
BILIRUB SERPL-MCNC: NORMAL MG/DL
BLOOD URINE, POC: 250
COLOR, POC UA: NORMAL
GLUCOSE UR QL STRIP: NORMAL
KETONES UR QL STRIP: NORMAL
LEUKOCYTE ESTERASE URINE, POC: NORMAL
NITRITE, POC UA: NORMAL
PH, POC UA: 7
PROTEIN, POC: NORMAL
SPECIFIC GRAVITY, POC UA: 1.01
UROBILINOGEN, POC UA: NORMAL

## 2019-03-14 PROCEDURE — 93005 ELECTROCARDIOGRAM TRACING: CPT | Mod: PBBFAC | Performed by: INTERNAL MEDICINE

## 2019-03-14 PROCEDURE — 71046 XR CHEST PA AND LATERAL: ICD-10-PCS | Mod: 26,,, | Performed by: RADIOLOGY

## 2019-03-14 PROCEDURE — 99214 OFFICE O/P EST MOD 30 MIN: CPT | Mod: PBBFAC,25 | Performed by: UROLOGY

## 2019-03-14 PROCEDURE — 74018 RADEX ABDOMEN 1 VIEW: CPT | Mod: 26,,, | Performed by: RADIOLOGY

## 2019-03-14 PROCEDURE — 74018 XR ABDOMEN AP 1 VIEW: ICD-10-PCS | Mod: 26,,, | Performed by: RADIOLOGY

## 2019-03-14 PROCEDURE — 99214 PR OFFICE/OUTPT VISIT, EST, LEVL IV, 30-39 MIN: ICD-10-PCS | Mod: S$PBB,,, | Performed by: UROLOGY

## 2019-03-14 PROCEDURE — 71046 X-RAY EXAM CHEST 2 VIEWS: CPT | Mod: 26,,, | Performed by: RADIOLOGY

## 2019-03-14 PROCEDURE — 71046 X-RAY EXAM CHEST 2 VIEWS: CPT | Mod: TC

## 2019-03-14 PROCEDURE — 93010 EKG 12-LEAD: ICD-10-PCS | Mod: S$PBB,,, | Performed by: INTERNAL MEDICINE

## 2019-03-14 PROCEDURE — 99214 OFFICE O/P EST MOD 30 MIN: CPT | Mod: S$PBB,,, | Performed by: UROLOGY

## 2019-03-14 PROCEDURE — 93010 ELECTROCARDIOGRAM REPORT: CPT | Mod: S$PBB,,, | Performed by: INTERNAL MEDICINE

## 2019-03-14 PROCEDURE — 99999 PR PBB SHADOW E&M-EST. PATIENT-LVL IV: CPT | Mod: PBBFAC,,, | Performed by: UROLOGY

## 2019-03-14 PROCEDURE — 81002 URINALYSIS NONAUTO W/O SCOPE: CPT | Mod: PBBFAC | Performed by: UROLOGY

## 2019-03-14 PROCEDURE — 74018 RADEX ABDOMEN 1 VIEW: CPT | Mod: TC

## 2019-03-14 PROCEDURE — 99999 PR PBB SHADOW E&M-EST. PATIENT-LVL IV: ICD-10-PCS | Mod: PBBFAC,,, | Performed by: UROLOGY

## 2019-03-14 NOTE — TELEPHONE ENCOUNTER
Hello!  This is a mutual patient that Dr. Velasquez has scheduled for an ESWL on 4/2/19. She requires a cardiac clearance prior to surgery. Please provide instructions regarding holding/resuming Plavix.   Thank you!  QUAN Montenegro

## 2019-03-14 NOTE — PROGRESS NOTES
Chief Complaint: Hematuria    HPI:   3/14/19: 67 yo woman worked up for gross hematuria at the end of last year with Dr. Deras.  Has had recurrent gross hematuria in the last few months.  Ct RSS shows a 5mm right lower pole stone.  Cysto was normal.  -UTI 1/19.  Last week had 3d of pink/red in the toilet water.  Takes plavix since having cardiac stent in 2012.  Had an anaphylactic reaction to IV dye given at time of cardiac workup/MRI.  Had another cardiac stent last fall with IV contrast again and had some reaction after, took a lot of benadryl at the time to improve things.  Says it happens more when cleaning house really hard.    Allergies:  Iodine and iodide containing products; Ativan [lorazepam]; Avelox [moxifloxacin]; Codeine; Doxycycline; and Sulfa (sulfonamide antibiotics)    Medications: has a current medication list which includes the following prescription(s): atorvastatin, carvedilol, cetirizine, clopidogrel, esomeprazole, fluticasone, lactobacillus rhamnosus gg, levothyroxine, melatonin, multivitamin, sertraline, tizanidine, turmeric, vitamin d2, and zolpidem.    Review of Systems:  General: No fever, chills, fatigability, or weight loss.  Skin: No rashes, itching, or changes in color or texture of skin.  Chest: Denies BRASWELL, cyanosis, wheezing, cough, and sputum production.  Abdomen: Appetite fine. No weight loss. Denies diarrhea, abdominal pain, hematemesis, or blood in stool.  Musculoskeletal: No joint stiffness or swelling. Denies back pain.  : As above.  All other review of systems negative.    PMH:   has a past medical history of Anticoagulant long-term use, Anxiety state (9/28/2010), CAD (coronary artery disease), Depression, Dyslipidemia, Encounter for blood transfusion, GERD (gastroesophageal reflux disease), Hypothyroidism (8/17/2012), Kidney stone, Nephrolithiasis, Osteopenia (2013), S/P coronary artery stent placement, Thyroid disease, and Vitamin D deficiency.    PSH:   has a past  surgical history that includes Cholecystectomy; Mini-laparotomy; Ectopic pregnancy surgery (Left); Coronary angioplasty with stent (2012); Colonoscopy w/ polypectomy (10/16/2007); Colonoscopy (~2000); Colonoscopy (N/A, 4/4/2018); Cardiac surgery; Esophagogastroduodenoscopy (09/16/2008); Esophagogastroduodenoscopy (11/29/2011); Esophagogastroduodenoscopy (N/A, 7/23/2018); Left heart catheterization (Left, 10/29/2018); and Coronary stent placement (N/A, 10/29/2018).    FamHx: family history includes Breast cancer in her mother; Heart disease in her father and mother; Hyperlipidemia in her father and mother; Hypertension in her father and mother; Melanoma in her brother; Nephrolithiasis in her father; Ovarian cancer in her sister; Prostate cancer in her father.    SocHx:  reports that she has quit smoking. Her smoking use included cigarettes. she has never used smokeless tobacco. She reports that she drinks about 0.6 oz of alcohol per week. She reports that she does not use drugs.     Physical Exam:  Vitals: There were no vitals filed for this visit.  General: A&Ox3. No apparent distress. No deformities.  Neck: No masses. Normal thyroid.  Lungs: normal inspiration. No use of accessory muscles.  Heart: normal pulse. No arrhythmias.  Abdomen: Soft. NT. ND.  Skin: The skin is warm and dry. No jaundice.  Ext: No c/c/e.  : external genitalia normal    Labs/Studies: Urinalysis performed in clinic, summary: UA normal, pH 7    Impression/Plan:   1. Indications for IVP or CT Urogram but would prefer no IV contrast.  The stone is the only obvious cause of hematuria currently.  Could do Cysto/RPG/ESWL.  But discussed ESWL to clear the stone and if the stone is cleared see if the hematuria resolves.  Multiple staged procedures possible.

## 2019-03-14 NOTE — LETTER
March 14, 2019      Kian Belcher MD  [address] [facility]  [place] [address]           [facility]  [address] [place] [id number]  Phone: [phone]  Fax: [phone]          Patient: Severiano Downing   MR Number: [id number]   YOB: 1950   Date of Visit: 3/14/2019       Dear Dr. Kian Belcher:    Thank you for referring Severiano Downing to me for evaluation. Attached you will find relevant portions of my assessment and plan of care.    If you have questions, please do not hesitate to call me. I look forward to following Severiano Downing along with you.    Sincerely,    Jack Velasquez IV, MD    Enclosure  CC:  No Recipients    If you would like to receive this communication electronically, please contact [email or web address] or [phone] to request more information on [facility] access.    For providers and/or their staff who would like to refer a patient to [facility], please contact us through our one-stop-shop provider referral line, [facility], at [phone].    If you feel you have received this communication in error or would no longer like to receive these types of communications, please e-mail [email or web address]

## 2019-03-16 RX ORDER — ATORVASTATIN CALCIUM 10 MG/1
10 TABLET, FILM COATED ORAL DAILY
Qty: 90 TABLET | Refills: 3 | Status: SHIPPED | OUTPATIENT
Start: 2019-03-16 | End: 2019-11-15 | Stop reason: SDUPTHER

## 2019-03-19 ENCOUNTER — TELEPHONE (OUTPATIENT)
Dept: UROLOGY | Facility: CLINIC | Age: 69
End: 2019-03-19

## 2019-03-31 ENCOUNTER — EXTERNAL CHRONIC CARE MANAGEMENT (OUTPATIENT)
Dept: PRIMARY CARE CLINIC | Facility: CLINIC | Age: 69
End: 2019-03-31
Payer: MEDICARE

## 2019-03-31 PROCEDURE — 99490 CHRNC CARE MGMT STAFF 1ST 20: CPT | Mod: S$GLB,,, | Performed by: FAMILY MEDICINE

## 2019-03-31 PROCEDURE — 99490 PR CHRONIC CARE MGMT, 1ST 20 MIN: ICD-10-PCS | Mod: S$GLB,,, | Performed by: FAMILY MEDICINE

## 2019-04-18 RX ORDER — ERGOCALCIFEROL 1.25 MG/1
CAPSULE ORAL
Qty: 24 CAPSULE | Refills: 3 | Status: SHIPPED | OUTPATIENT
Start: 2019-04-18 | End: 2019-10-15 | Stop reason: SDUPTHER

## 2019-04-23 ENCOUNTER — TELEPHONE (OUTPATIENT)
Dept: UROLOGY | Facility: CLINIC | Age: 69
End: 2019-04-23

## 2019-04-23 NOTE — TELEPHONE ENCOUNTER
----- Message from Rachana Broussard sent at 4/23/2019  9:29 AM CDT -----  Contact: pt  Please call pt @ [phone] regarding appt tomorrow, pt need to know what the appt for, pt states she has not had surgery.

## 2019-04-25 ENCOUNTER — PATIENT OUTREACH (OUTPATIENT)
Dept: ADMINISTRATIVE | Facility: HOSPITAL | Age: 69
End: 2019-04-25

## 2019-04-25 DIAGNOSIS — Z12.31 ENCOUNTER FOR SCREENING MAMMOGRAM FOR BREAST CANCER: Primary | ICD-10-CM

## 2019-04-25 NOTE — PROGRESS NOTES
Attempted to contact pt to schedule a mammogram. No answer and unable leave VM. Will attempt to contact at a later time.

## 2019-04-30 ENCOUNTER — EXTERNAL CHRONIC CARE MANAGEMENT (OUTPATIENT)
Dept: PRIMARY CARE CLINIC | Facility: CLINIC | Age: 69
End: 2019-04-30
Payer: MEDICARE

## 2019-04-30 PROCEDURE — 99490 CHRNC CARE MGMT STAFF 1ST 20: CPT | Mod: S$GLB,,, | Performed by: FAMILY MEDICINE

## 2019-04-30 PROCEDURE — 99490 PR CHRONIC CARE MGMT, 1ST 20 MIN: ICD-10-PCS | Mod: S$GLB,,, | Performed by: FAMILY MEDICINE

## 2019-05-03 ENCOUNTER — TELEPHONE (OUTPATIENT)
Dept: UROLOGY | Facility: CLINIC | Age: 69
End: 2019-05-03

## 2019-05-03 DIAGNOSIS — Z01.818 PRE-OP TESTING: Primary | ICD-10-CM

## 2019-05-03 RX ORDER — AMOXICILLIN 500 MG
CAPSULE ORAL DAILY
COMMUNITY

## 2019-05-03 NOTE — PRE ADMISSION SCREENING
Pre op instructions reviewed with patient per phone:    To confirm, Your surgeon has instructed you:  Surgery is scheduled 05/07/2019 at per [facility].      Please report to [facility] 1[facility] floor main lobby by per [facility].         INSTRUCTIONS IMPORTANT!!!  ¨ Do not eat, drink, or smoke after 12 midnight-including water. OK to brush teeth, no gum, candy or mints!    ¨ Take only these medicines with a small swallow of water-morning of surgery.  Lipitor,carvedilol, synthroid,zoloft    ____  Do not wear makeup, including mascara.  ____  No powder, lotions or creams to surgical area.  ____  Please remove all jewelry, including piercings and leave at home.  ____  No money or valuables needed. Please leave at home.  ____  Please bring identification and insurance information to hospital.  ____  If going home the same day, arrange for a ride home. You will not be able to   drive if Anesthesia was used.  ____  Children, under 12 years old, must remain in the waiting room with an adult.  They are not allowed in patient areas.  ____  Wear loose fitting clothing. Allow for dressings, bandages.  ____  Stop Aspirin, Ibuprofen, Motrin and Aleve at least 5-7 days before surgery, unless otherwise instructed by your doctor, or the nurse.   You MAY use Tylenol/acetaminophen until day of surgery.  ____  If you take diabetic medication, do not take am of surgery unless instructed by   Doctor.  ____ Stop taking any Fish Oil supplement or any Vitamins that contain Vitamin E at least 5 days prior to surgery.          Bathing Instructions-- The night before surgery and the morning prior to coming to the hospital:   -Do not shave the surgical area.   -Shower and wash your hair and body as usual with anti-bacterial  soap and shampoo.   -Rinse your hair and body completely.   -Use one packet of hibiclens to wash the surgical site (using your hand) gently for 5 minutes.  Do not scrub you skin too hard.   -Do not use hibiclens on  your head, face, or genitals.   -Do not wash with anti-bacterial soap after you use the hibiclens.   -Rinse your body thoroughly.   -Dry with clean, soft towel.  Do not use lotion, cream, deodorant, or powders on   the surgical site.    Use antibacterial soap in place of hibiclens if your surgery is on the head, face or genitals.         Surgical Site Infection    Prevention of surgical site infections:     -Keep incisions clean and dry.   -Do not soak/submerge incisions in water until completely healed.   -Do not apply lotions, powders, creams, or deodorants to site.   -Always make sure hands are cleaned with antibacterial soap/ alcohol-based   prior to touching the surgical site.  (This includes doctors, nurses, staff, and yourself.)    Signs and symptoms:   -Redness and pain around the area where you had surgery   -Drainage of cloudy fluid from your surgical wound   -Fever over 100.4  I have read or had read and explained to me, and understand the above information.

## 2019-05-06 ENCOUNTER — TELEPHONE (OUTPATIENT)
Dept: UROLOGY | Facility: CLINIC | Age: 69
End: 2019-05-06

## 2019-05-06 ENCOUNTER — LAB VISIT (OUTPATIENT)
Dept: LAB | Facility: HOSPITAL | Age: 69
End: 2019-05-06
Attending: FAMILY MEDICINE
Payer: MEDICARE

## 2019-05-06 DIAGNOSIS — Z01.818 PRE-OP TESTING: ICD-10-CM

## 2019-05-06 DIAGNOSIS — R73.9 HYPERGLYCEMIA: ICD-10-CM

## 2019-05-06 LAB
ANION GAP SERPL CALC-SCNC: 7 MMOL/L (ref 8–16)
BASOPHILS # BLD AUTO: 0.04 K/UL (ref 0–0.2)
BASOPHILS NFR BLD: 0.5 % (ref 0–1.9)
BUN SERPL-MCNC: 12 MG/DL (ref 8–23)
CALCIUM SERPL-MCNC: 9.8 MG/DL (ref 8.7–10.5)
CHLORIDE SERPL-SCNC: 107 MMOL/L (ref 95–110)
CO2 SERPL-SCNC: 27 MMOL/L (ref 23–29)
CREAT SERPL-MCNC: 0.7 MG/DL (ref 0.5–1.4)
DIFFERENTIAL METHOD: NORMAL
EOSINOPHIL # BLD AUTO: 0.1 K/UL (ref 0–0.5)
EOSINOPHIL NFR BLD: 1.8 % (ref 0–8)
ERYTHROCYTE [DISTWIDTH] IN BLOOD BY AUTOMATED COUNT: 14.3 % (ref 11.5–14.5)
EST. GFR  (AFRICAN AMERICAN): >60 ML/MIN/1.73 M^2
EST. GFR  (NON AFRICAN AMERICAN): >60 ML/MIN/1.73 M^2
ESTIMATED AVG GLUCOSE: 128 MG/DL (ref 68–131)
GLUCOSE SERPL-MCNC: 143 MG/DL (ref 70–110)
GLUCOSE SERPL-MCNC: 147 MG/DL (ref 70–110)
GLUCOSE SERPL-MCNC: 210 MG/DL
GLUCOSE SERPL-MCNC: 312 MG/DL
HBA1C MFR BLD HPLC: 6.1 % (ref 4–5.6)
HCT VFR BLD AUTO: 43.6 % (ref 37–48.5)
HGB BLD-MCNC: 14.5 G/DL (ref 12–16)
LYMPHOCYTES # BLD AUTO: 2.4 K/UL (ref 1–4.8)
LYMPHOCYTES NFR BLD: 31.4 % (ref 18–48)
MCH RBC QN AUTO: 29.7 PG (ref 27–31)
MCHC RBC AUTO-ENTMCNC: 33.3 G/DL (ref 32–36)
MCV RBC AUTO: 89 FL (ref 82–98)
MONOCYTES # BLD AUTO: 0.6 K/UL (ref 0.3–1)
MONOCYTES NFR BLD: 8.5 % (ref 4–15)
NEUTROPHILS # BLD AUTO: 4.4 K/UL (ref 1.8–7.7)
NEUTROPHILS NFR BLD: 57.8 % (ref 38–73)
PLATELET # BLD AUTO: 304 K/UL (ref 150–350)
PMV BLD AUTO: 11.1 FL (ref 9.2–12.9)
POTASSIUM SERPL-SCNC: 4.3 MMOL/L (ref 3.5–5.1)
RBC # BLD AUTO: 4.88 M/UL (ref 4–5.4)
SODIUM SERPL-SCNC: 141 MMOL/L (ref 136–145)
WBC # BLD AUTO: 7.57 K/UL (ref 3.9–12.7)

## 2019-05-06 PROCEDURE — 83036 HEMOGLOBIN GLYCOSYLATED A1C: CPT

## 2019-05-06 PROCEDURE — 85025 COMPLETE CBC W/AUTO DIFF WBC: CPT | Mod: PO

## 2019-05-06 PROCEDURE — 82951 GLUCOSE TOLERANCE TEST (GTT): CPT

## 2019-05-06 PROCEDURE — 80048 BASIC METABOLIC PNL TOTAL CA: CPT

## 2019-05-06 PROCEDURE — 36415 COLL VENOUS BLD VENIPUNCTURE: CPT | Mod: PO

## 2019-05-07 ENCOUNTER — TELEPHONE (OUTPATIENT)
Dept: UROLOGY | Facility: CLINIC | Age: 69
End: 2019-05-07

## 2019-05-07 ENCOUNTER — ANESTHESIA EVENT (OUTPATIENT)
Dept: SURGERY | Facility: HOSPITAL | Age: 69
End: 2019-05-07
Payer: MEDICARE

## 2019-05-07 ENCOUNTER — ANESTHESIA (OUTPATIENT)
Dept: SURGERY | Facility: HOSPITAL | Age: 69
End: 2019-05-07
Payer: MEDICARE

## 2019-05-07 ENCOUNTER — HOSPITAL ENCOUNTER (OUTPATIENT)
Facility: HOSPITAL | Age: 69
Discharge: HOME | End: 2019-05-07
Attending: UROLOGY | Admitting: UROLOGY
Payer: MEDICARE

## 2019-05-07 VITALS
TEMPERATURE: 97 F | RESPIRATION RATE: 16 BRPM | WEIGHT: 183.88 LBS | HEIGHT: 62 IN | OXYGEN SATURATION: 95 % | DIASTOLIC BLOOD PRESSURE: 60 MMHG | BODY MASS INDEX: 33.84 KG/M2 | HEART RATE: 80 BPM | SYSTOLIC BLOOD PRESSURE: 125 MMHG

## 2019-05-07 DIAGNOSIS — N20.0 RENAL STONE: ICD-10-CM

## 2019-05-07 DIAGNOSIS — N20.0 RENAL STONE: Primary | ICD-10-CM

## 2019-05-07 PROCEDURE — 25000003 PHARM REV CODE 250: Performed by: NURSE ANESTHETIST, CERTIFIED REGISTERED

## 2019-05-07 PROCEDURE — 63600175 PHARM REV CODE 636 W HCPCS: Performed by: UROLOGY

## 2019-05-07 PROCEDURE — 50590 PR FRAGMENT KIDNEY STONE/ ESWL: ICD-10-PCS | Mod: RT,,, | Performed by: UROLOGY

## 2019-05-07 PROCEDURE — 37000008 HC ANESTHESIA 1ST 15 MINUTES: Performed by: UROLOGY

## 2019-05-07 PROCEDURE — 71000033 HC RECOVERY, INTIAL HOUR: Performed by: UROLOGY

## 2019-05-07 PROCEDURE — 37000009 HC ANESTHESIA EA ADD 15 MINS: Performed by: UROLOGY

## 2019-05-07 PROCEDURE — 36000704 HC OR TIME LEV I 1ST 15 MIN: Performed by: UROLOGY

## 2019-05-07 PROCEDURE — 63600175 PHARM REV CODE 636 W HCPCS: Performed by: NURSE ANESTHETIST, CERTIFIED REGISTERED

## 2019-05-07 PROCEDURE — 50590 FRAGMENTING OF KIDNEY STONE: CPT | Mod: RT,,, | Performed by: UROLOGY

## 2019-05-07 PROCEDURE — 71000015 HC POSTOP RECOV 1ST HR: Performed by: UROLOGY

## 2019-05-07 PROCEDURE — 36000705 HC OR TIME LEV I EA ADD 15 MIN: Performed by: UROLOGY

## 2019-05-07 RX ORDER — CEFAZOLIN SODIUM 2 G/50ML
2 SOLUTION INTRAVENOUS
Status: DISCONTINUED | OUTPATIENT
Start: 2019-05-07 | End: 2019-05-07 | Stop reason: HOSPADM

## 2019-05-07 RX ORDER — ONDANSETRON 2 MG/ML
4 INJECTION INTRAMUSCULAR; INTRAVENOUS EVERY 12 HOURS PRN
Status: DISCONTINUED | OUTPATIENT
Start: 2019-05-07 | End: 2019-05-07 | Stop reason: HOSPADM

## 2019-05-07 RX ORDER — OXYCODONE AND ACETAMINOPHEN 5; 325 MG/1; MG/1
1-2 TABLET ORAL EVERY 4 HOURS PRN
Qty: 30 TABLET | Refills: 0 | Status: SHIPPED | OUTPATIENT
Start: 2019-05-07 | End: 2019-05-09

## 2019-05-07 RX ORDER — ONDANSETRON 2 MG/ML
4 INJECTION INTRAMUSCULAR; INTRAVENOUS DAILY PRN
Status: DISCONTINUED | OUTPATIENT
Start: 2019-05-07 | End: 2019-05-07 | Stop reason: HOSPADM

## 2019-05-07 RX ORDER — FUROSEMIDE 10 MG/ML
INJECTION INTRAMUSCULAR; INTRAVENOUS
Status: DISCONTINUED | OUTPATIENT
Start: 2019-05-07 | End: 2019-05-07

## 2019-05-07 RX ORDER — HYDROCODONE BITARTRATE AND ACETAMINOPHEN 5; 325 MG/1; MG/1
1 TABLET ORAL EVERY 4 HOURS PRN
Status: DISCONTINUED | OUTPATIENT
Start: 2019-05-07 | End: 2019-05-07 | Stop reason: HOSPADM

## 2019-05-07 RX ORDER — SODIUM CHLORIDE, SODIUM LACTATE, POTASSIUM CHLORIDE, CALCIUM CHLORIDE 600; 310; 30; 20 MG/100ML; MG/100ML; MG/100ML; MG/100ML
INJECTION, SOLUTION INTRAVENOUS CONTINUOUS PRN
Status: DISCONTINUED | OUTPATIENT
Start: 2019-05-07 | End: 2019-05-07

## 2019-05-07 RX ORDER — DOCUSATE SODIUM 100 MG/1
100 CAPSULE, LIQUID FILLED ORAL 2 TIMES DAILY
Qty: 60 CAPSULE | Refills: 0 | Status: SHIPPED | OUTPATIENT
Start: 2019-05-07 | End: 2019-11-15

## 2019-05-07 RX ORDER — OXYCODONE AND ACETAMINOPHEN 5; 325 MG/1; MG/1
1 TABLET ORAL
Status: DISCONTINUED | OUTPATIENT
Start: 2019-05-07 | End: 2019-05-07 | Stop reason: HOSPADM

## 2019-05-07 RX ORDER — HYDROMORPHONE HYDROCHLORIDE 2 MG/ML
0.2 INJECTION, SOLUTION INTRAMUSCULAR; INTRAVENOUS; SUBCUTANEOUS EVERY 5 MIN PRN
Status: DISCONTINUED | OUTPATIENT
Start: 2019-05-07 | End: 2019-05-07 | Stop reason: HOSPADM

## 2019-05-07 RX ORDER — TAMSULOSIN HYDROCHLORIDE 0.4 MG/1
0.4 CAPSULE ORAL DAILY
Qty: 30 CAPSULE | Refills: 1 | Status: SHIPPED | OUTPATIENT
Start: 2019-05-07 | End: 2019-05-09

## 2019-05-07 RX ORDER — MIDAZOLAM HYDROCHLORIDE 1 MG/ML
INJECTION, SOLUTION INTRAMUSCULAR; INTRAVENOUS
Status: DISCONTINUED | OUTPATIENT
Start: 2019-05-07 | End: 2019-05-07

## 2019-05-07 RX ORDER — FENTANYL CITRATE 50 UG/ML
25 INJECTION, SOLUTION INTRAMUSCULAR; INTRAVENOUS EVERY 5 MIN PRN
Status: DISCONTINUED | OUTPATIENT
Start: 2019-05-07 | End: 2019-05-07 | Stop reason: HOSPADM

## 2019-05-07 RX ORDER — CIPROFLOXACIN 500 MG/1
500 TABLET ORAL EVERY 12 HOURS
Qty: 6 TABLET | Refills: 0 | Status: SHIPPED | OUTPATIENT
Start: 2019-05-07 | End: 2019-05-10

## 2019-05-07 RX ORDER — KETOROLAC TROMETHAMINE 30 MG/ML
INJECTION, SOLUTION INTRAMUSCULAR; INTRAVENOUS
Status: DISCONTINUED | OUTPATIENT
Start: 2019-05-07 | End: 2019-05-07

## 2019-05-07 RX ORDER — PROPOFOL 10 MG/ML
VIAL (ML) INTRAVENOUS CONTINUOUS PRN
Status: DISCONTINUED | OUTPATIENT
Start: 2019-05-07 | End: 2019-05-07

## 2019-05-07 RX ORDER — HYDROCODONE BITARTRATE AND ACETAMINOPHEN 10; 325 MG/1; MG/1
1 TABLET ORAL EVERY 4 HOURS PRN
Status: DISCONTINUED | OUTPATIENT
Start: 2019-05-07 | End: 2019-05-07 | Stop reason: HOSPADM

## 2019-05-07 RX ORDER — ACETAMINOPHEN 325 MG/1
650 TABLET ORAL EVERY 4 HOURS PRN
Status: DISCONTINUED | OUTPATIENT
Start: 2019-05-07 | End: 2019-05-07 | Stop reason: HOSPADM

## 2019-05-07 RX ADMIN — SODIUM CHLORIDE, SODIUM LACTATE, POTASSIUM CHLORIDE, AND CALCIUM CHLORIDE: .6; .31; .03; .02 INJECTION, SOLUTION INTRAVENOUS at 08:05

## 2019-05-07 RX ADMIN — FUROSEMIDE 10 MG: 10 INJECTION, SOLUTION INTRAMUSCULAR; INTRAVENOUS at 08:05

## 2019-05-07 RX ADMIN — MIDAZOLAM 2 MG: 1 INJECTION INTRAMUSCULAR; INTRAVENOUS at 08:05

## 2019-05-07 RX ADMIN — PROPOFOL 700 MG/HR: 10 INJECTION, EMULSION INTRAVENOUS at 08:05

## 2019-05-07 RX ADMIN — KETOROLAC TROMETHAMINE 30 MG: 30 INJECTION, SOLUTION INTRAMUSCULAR; INTRAVENOUS at 08:05

## 2019-05-07 RX ADMIN — CEFAZOLIN SODIUM 2 G: 2 SOLUTION INTRAVENOUS at 08:05

## 2019-05-07 NOTE — DISCHARGE SUMMARY
Date of Discharge: 05/07/2019     Principle Diagnosis: Right renal stone    Secondary Diagnosis:  has a past medical history of Anticoagulant long-term use, Anxiety state (9/28/2010), CAD (coronary artery disease), Depression, Dyslipidemia, Encounter for blood transfusion, GERD (gastroesophageal reflux disease), Hypothyroidism (8/17/2012), Kidney stone, Myocardial infarction (2012), Nephrolithiasis, Osteopenia (2013), S/P coronary artery stent placement, Thyroid disease, and Vitamin D deficiency.    History of Present Illness: Pt was worked up in clinic and today's procedure was scheduled.  Please see H&P for full details.    Hospital Course: Pt presented on the day of surgery and after proper consents were obtained he was brought to the OR where his procedure was performed without difficulty.    Discharge Disposition: Home    Followup Plan:  1. Pt is provided with medications for pain and others as indicated.  2. RTC in 3 weeks

## 2019-05-07 NOTE — TRANSFER OF CARE
"Anesthesia Transfer of Care Note    Patient: Severiano Downing    Procedure(s) Performed: Procedure(s) (LRB):  LITHOTRIPSY, ESWL (Right)    Patient location: PACU    Anesthesia Type: MAC    Transport from OR: Transported from OR on room air with adequate spontaneous ventilation    Post pain: adequate analgesia    Post assessment: no apparent anesthetic complications    Post vital signs: stable    Level of consciousness: awake, alert and oriented    Nausea/Vomiting: no nausea/vomiting    Complications: none    Transfer of care protocol was followed      Last vitals:   Visit Vitals  BP (!) 115/59   Pulse 83   Temp 36.3 °C (97.4 °F) (Temporal)   Resp 16   Ht 5' 2" (1.575 m)   Wt 83.4 kg (183 lb 13.8 oz)   SpO2 (!) 94%   Breastfeeding? No   BMI 33.63 kg/m²     "

## 2019-05-07 NOTE — H&P
Chief Complaint: Hematuria     HPI:   5/7/19: Ready for ESWL of right lower pole stone.  3/14/19: 69 yo woman worked up for gross hematuria at the end of last year with Dr. Deras.  Has had recurrent gross hematuria in the last few months.  Ct RSS shows a 5mm right lower pole stone.  Cysto was normal.  -UTI 1/19.  Last week had 3d of pink/red in the toilet water.  Takes plavix since having cardiac stent in 2012.  Had an anaphylactic reaction to IV dye given at time of cardiac workup/MRI.  Had another cardiac stent last fall with IV contrast again and had some reaction after, took a lot of benadryl at the time to improve things.  Says it happens more when cleaning house really hard.     Allergies:  Iodine and iodide containing products; Ativan [lorazepam]; Avelox [moxifloxacin]; Codeine; Doxycycline; and Sulfa (sulfonamide antibiotics)     Medications: has a current medication list which includes the following prescription(s): atorvastatin, carvedilol, cetirizine, clopidogrel, esomeprazole, fluticasone, lactobacillus rhamnosus gg, levothyroxine, melatonin, multivitamin, sertraline, tizanidine, turmeric, vitamin d2, and zolpidem.     Review of Systems:  General: No fever, chills, fatigability, or weight loss.  Skin: No rashes, itching, or changes in color or texture of skin.  Chest: Denies BRASWELL, cyanosis, wheezing, cough, and sputum production.  Abdomen: Appetite fine. No weight loss. Denies diarrhea, abdominal pain, hematemesis, or blood in stool.  Musculoskeletal: No joint stiffness or swelling. Denies back pain.  : As above.  All other review of systems negative.     PMH:   has a past medical history of Anticoagulant long-term use, Anxiety state (9/28/2010), CAD (coronary artery disease), Depression, Dyslipidemia, Encounter for blood transfusion, GERD (gastroesophageal reflux disease), Hypothyroidism (8/17/2012), Kidney stone, Nephrolithiasis, Osteopenia (2013), S/P coronary artery stent placement, Thyroid  disease, and Vitamin D deficiency.     PSH:   has a past surgical history that includes Cholecystectomy; Mini-laparotomy; Ectopic pregnancy surgery (Left); Coronary angioplasty with stent (2012); Colonoscopy w/ polypectomy (10/16/2007); Colonoscopy (~2000); Colonoscopy (N/A, 4/4/2018); Cardiac surgery; Esophagogastroduodenoscopy (09/16/2008); Esophagogastroduodenoscopy (11/29/2011); Esophagogastroduodenoscopy (N/A, 7/23/2018); Left heart catheterization (Left, 10/29/2018); and Coronary stent placement (N/A, 10/29/2018).     FamHx: family history includes Breast cancer in her mother; Heart disease in her father and mother; Hyperlipidemia in her father and mother; Hypertension in her father and mother; Melanoma in her brother; Nephrolithiasis in her father; Ovarian cancer in her sister; Prostate cancer in her father.     SocHx:  reports that she has quit smoking. Her smoking use included cigarettes. she has never used smokeless tobacco. She reports that she drinks about 0.6 oz of alcohol per week. She reports that she does not use drugs.      Physical Exam:  Vitals: There were no vitals filed for this visit.  General: A&Ox3. No apparent distress. No deformities.  Neck: No masses. Normal thyroid.  Lungs: normal inspiration. No use of accessory muscles.  Heart: normal pulse. No arrhythmias.  Abdomen: Soft. NT. ND.  Skin: The skin is warm and dry. No jaundice.  Ext: No c/c/e.  :   3/19: external genitalia normal     Labs/Studies: Urinalysis performed in clinic, summary: UA normal, pH 7     Impression/Plan:   1. Indications for IVP or CT Urogram but would prefer no IV contrast.  The stone is the only obvious cause of hematuria currently.  Could do Cysto/RPG/ESWL.  But discussed ESWL to clear the stone and if the stone is cleared see if the hematuria resolves.  Multiple staged procedures possible.

## 2019-05-07 NOTE — ANESTHESIA POSTPROCEDURE EVALUATION
Anesthesia Post Evaluation    Patient: Severiano Downing    Procedure(s) Performed: Procedure(s) (LRB):  LITHOTRIPSY, ESWL (Right)    Final Anesthesia Type: MAC  Patient location during evaluation: PACU  Patient participation: Yes- Able to Participate  Level of consciousness: awake  Post-procedure vital signs: reviewed and stable  Pain management: adequate  Airway patency: patent  PONV status at discharge: No PONV  Anesthetic complications: no      Cardiovascular status: stable  Respiratory status: unassisted  Hydration status: euvolemic  Follow-up not needed.          Vitals Value Taken Time   /60 5/7/2019 10:00 AM   Temp 36.3 °C (97.4 °F) 5/7/2019 10:00 AM   Pulse 80 5/7/2019 10:00 AM   Resp 16 5/7/2019 10:00 AM   SpO2 95 % 5/7/2019 10:00 AM         Event Time     Out of Recovery 09:33:51          Pain/Jae Score: Jae Score: 10 (5/7/2019 10:00 AM)

## 2019-05-07 NOTE — ANESTHESIA PREPROCEDURE EVALUATION
05/07/2019  Severiano Downing is a 68 y.o., female.    Pre-op Assessment    I have reviewed the Patient Summary Reports.     I have reviewed the Nursing Notes.   I have reviewed the Medications.     Review of Systems  Anesthesia Hx:  No problems with previous Anesthesia  Denies Family Hx of Anesthesia complications.   Denies Personal Hx of Anesthesia complications.   Social:  Former Smoker Smoking Status: Former Smoker  Quit Smoking:    Smokeless Tobacco Status: Never Used  Alcohol use: Yes; 0.6 oz per week  Drug use: No       Cardiovascular:   Past MI CAD asymptomatic CABG/stent  hyperlipidemia CAD (coronary artery disease)  S/P coronary artery stent placement     Pulmonary:   Shortness of breath    Renal/:   renal calculi    Hepatic/GI:   GERD    Endocrine:   Hypothyroidism    Psych:   Psychiatric History          Physical Exam  General:  Obesity    Airway/Jaw/Neck:  Airway Findings: Mouth Opening: Normal Tongue: Normal  General Airway Assessment: Adult  Mallampati: II  Improves to II with phonation.  TM Distance: Normal, at least 6 cm         Dental:  Dental Findings: In tact   Chest/Lungs:  Chest/Lungs Findings: Clear to auscultation, Normal Respiratory Rate     Heart/Vascular:  Heart Findings: Rate: Normal  Rhythm: Regular Rhythm  Sounds: Normal  Heart murmur: negative       Mental Status:  Mental Status Findings:  Cooperative, Alert and Oriented         Anesthesia Plan  Type of Anesthesia, risks & benefits discussed:  Anesthesia Type:  MAC  Patient's Preference:   Intra-op Monitoring Plan: standard ASA monitors  Intra-op Monitoring Plan Comments:   Post Op Pain Control Plan: multimodal analgesia  Post Op Pain Control Plan Comments:   Induction:   IV  Beta Blocker:  Patient is on a Beta-Blocker and has received one dose within the past 24 hours (No further documentation required).       Informed  Consent: Patient understands risks and agrees with Anesthesia plan.  Questions answered. Anesthesia consent signed with patient.  ASA Score: 3     Day of Surgery Review of History & Physical: I have interviewed and examined the patient. I have reviewed the patient's H&P dated:  There are no significant changes.  H&P update referred to the provider.         Ready For Surgery From Anesthesia Perspective.

## 2019-05-08 ENCOUNTER — TELEPHONE (OUTPATIENT)
Dept: UROLOGY | Facility: CLINIC | Age: 69
End: 2019-05-08

## 2019-05-08 NOTE — TELEPHONE ENCOUNTER
Pt wanted to know if she should collect all the stone pieces she passes. Explained that she should collect just a few stone pieces. She can put into cup & bring to next appt. They will be sent out for stone analysis.  Pt verbalized understanding.

## 2019-05-08 NOTE — TELEPHONE ENCOUNTER
----- Message from Vane Navarrete sent at 5/8/2019 12:30 PM CDT -----  Type:  Needs Medical Advice    Who Called:  Arden العلي  Symptoms (please be specific):  Kidney stones   How long has patient had these symptoms:   Had procedure done yesterday//5-7-19  Pharmacy name and phone #:     Would the patient rather a call back or a response via [facility]?  Call back  Best Call Back Number:  [phone]  Additional Information:  States she has questions regarding the collections of the kidney stone pieces that she passes//please call to discuss//thanks/veena

## 2019-05-09 ENCOUNTER — HOSPITAL ENCOUNTER (OUTPATIENT)
Dept: RADIOLOGY | Facility: HOSPITAL | Age: 69
Discharge: HOME | End: 2019-05-09
Attending: FAMILY MEDICINE
Payer: MEDICARE

## 2019-05-09 ENCOUNTER — OFFICE VISIT (OUTPATIENT)
Dept: FAMILY MEDICINE | Facility: CLINIC | Age: 69
End: 2019-05-09
Payer: MEDICARE

## 2019-05-09 VITALS
SYSTOLIC BLOOD PRESSURE: 108 MMHG | HEART RATE: 73 BPM | TEMPERATURE: 98 F | HEIGHT: 62 IN | WEIGHT: 183.63 LBS | BODY MASS INDEX: 33.79 KG/M2 | DIASTOLIC BLOOD PRESSURE: 64 MMHG

## 2019-05-09 VITALS — WEIGHT: 183 LBS | BODY MASS INDEX: 33.68 KG/M2 | HEIGHT: 62 IN

## 2019-05-09 DIAGNOSIS — R73.03 PREDIABETES: Primary | ICD-10-CM

## 2019-05-09 DIAGNOSIS — Z12.31 ENCOUNTER FOR SCREENING MAMMOGRAM FOR BREAST CANCER: ICD-10-CM

## 2019-05-09 PROCEDURE — 99999 PR PBB SHADOW E&M-EST. PATIENT-LVL III: CPT | Mod: PBBFAC,,, | Performed by: NURSE PRACTITIONER

## 2019-05-09 PROCEDURE — 3074F PR MOST RECENT SYSTOLIC BLOOD PRESSURE < 130 MM HG: ICD-10-PCS | Mod: S$GLB,,, | Performed by: NURSE PRACTITIONER

## 2019-05-09 PROCEDURE — 77067 SCR MAMMO BI INCL CAD: CPT | Mod: TC,PO

## 2019-05-09 PROCEDURE — 3078F PR MOST RECENT DIASTOLIC BLOOD PRESSURE < 80 MM HG: ICD-10-PCS | Mod: S$GLB,,, | Performed by: NURSE PRACTITIONER

## 2019-05-09 PROCEDURE — 1101F PT FALLS ASSESS-DOCD LE1/YR: CPT | Mod: S$GLB,,, | Performed by: NURSE PRACTITIONER

## 2019-05-09 PROCEDURE — 77067 MAMMO DIGITAL SCREENING BILAT WITH TOMOSYNTHESIS_CAD: ICD-10-PCS | Mod: 26,,, | Performed by: RADIOLOGY

## 2019-05-09 PROCEDURE — 3074F SYST BP LT 130 MM HG: CPT | Mod: S$GLB,,, | Performed by: NURSE PRACTITIONER

## 2019-05-09 PROCEDURE — 77063 MAMMO DIGITAL SCREENING BILAT WITH TOMOSYNTHESIS_CAD: ICD-10-PCS | Mod: 26,,, | Performed by: RADIOLOGY

## 2019-05-09 PROCEDURE — 3078F DIAST BP <80 MM HG: CPT | Mod: S$GLB,,, | Performed by: NURSE PRACTITIONER

## 2019-05-09 PROCEDURE — 99214 PR OFFICE/OUTPT VISIT, EST, LEVL IV, 30-39 MIN: ICD-10-PCS | Mod: S$GLB,,, | Performed by: NURSE PRACTITIONER

## 2019-05-09 PROCEDURE — 77067 SCR MAMMO BI INCL CAD: CPT | Mod: 26,,, | Performed by: RADIOLOGY

## 2019-05-09 PROCEDURE — 99999 PR PBB SHADOW E&M-EST. PATIENT-LVL III: ICD-10-PCS | Mod: PBBFAC,,, | Performed by: NURSE PRACTITIONER

## 2019-05-09 PROCEDURE — 99214 OFFICE O/P EST MOD 30 MIN: CPT | Mod: S$GLB,,, | Performed by: NURSE PRACTITIONER

## 2019-05-09 PROCEDURE — 1101F PR PT FALLS ASSESS DOC 0-1 FALLS W/OUT INJ PAST YR: ICD-10-PCS | Mod: S$GLB,,, | Performed by: NURSE PRACTITIONER

## 2019-05-09 PROCEDURE — 77063 BREAST TOMOSYNTHESIS BI: CPT | Mod: 26,,, | Performed by: RADIOLOGY

## 2019-05-09 RX ORDER — TAMSULOSIN HYDROCHLORIDE 0.4 MG/1
0.4 CAPSULE ORAL DAILY
COMMUNITY
End: 2019-11-15

## 2019-05-09 NOTE — PROGRESS NOTES
Subjective:       Patient ID: Severiano Downing is a 68 y.o. female.    Chief Complaint: Follow-up    Diabetes   She presents for her follow-up diabetic visit. She has type 2 diabetes mellitus. Her disease course has been worsening. There are no hypoglycemic associated symptoms. Pertinent negatives for hypoglycemia include no dizziness, headaches or nervousness/anxiousness. Pertinent negatives for diabetes include no chest pain and no fatigue. There are no hypoglycemic complications. Symptoms are stable. There are no diabetic complications. Risk factors for coronary artery disease include sedentary lifestyle and obesity. When asked about current treatments, none were reported. Her weight is stable. She is following a generally healthy diet. She never participates in exercise. An ACE inhibitor/angiotensin II receptor blocker is being taken.       Review of Systems   Constitutional: Negative for fatigue, fever and unexpected weight change.   HENT: Negative for ear pain and sore throat.    Eyes: Negative for pain and visual disturbance.   Respiratory: Negative for cough and shortness of breath.    Cardiovascular: Negative for chest pain and palpitations.   Gastrointestinal: Negative for abdominal pain, diarrhea and vomiting.   Musculoskeletal: Negative for arthralgias and myalgias.   Skin: Negative for color change and rash.   Neurological: Negative for dizziness and headaches.   Psychiatric/Behavioral: Negative for dysphoric mood and sleep disturbance. The patient is not nervous/anxious.        Vitals:    05/09/19 0934   BP: 108/64   Pulse: 73   Temp: 98.1 °F (36.7 °C)       Objective:     Current Outpatient Medications   Medication Sig Dispense Refill    atorvastatin (LIPITOR) 10 MG tablet TAKE 1 TABLET (10 MG TOTAL) BY MOUTH ONCE DAILY. 90 tablet 3    carvedilol (COREG) 3.125 MG tablet TAKE 1 TABLET BY MOUTH 2 TIMES DAILY WITH MEALS. 180 tablet 1    cetirizine (ZYRTEC) 10 MG tablet Take 10 mg by mouth every  evening.       ciprofloxacin HCl (CIPRO) 500 MG tablet Take 1 tablet (500 mg total) by mouth every 12 (twelve) hours. for 6 doses 6 tablet 0    clopidogrel (PLAVIX) 75 mg tablet TAKE 1 TABLET ONE TIME DAILY 90 tablet 3    docusate sodium (COLACE) 100 MG capsule Take 1 capsule (100 mg total) by mouth 2 (two) times daily. 60 capsule 0    ergocalciferol (VITAMIN D2) 50,000 unit Cap TAKE 1 CAPSULE BY MOUTH TWICE A WEEK. 24 capsule 3    esomeprazole (NEXIUM) 20 MG capsule Take 20 mg by mouth every evening.       fish oil-omega-3 fatty acids 300-1,000 mg capsule Take by mouth once daily.      L. rhamnosus GG/inulin (CULTURELLE PROBIOTICS ORAL) Take by mouth 2 (two) times daily.      levothyroxine (SYNTHROID) 75 MCG tablet TAKE 1 TABLET ONE TIME DAILY 90 tablet 2    melatonin 5 mg Tab Take 1 tablet by mouth every evening.       sertraline (ZOLOFT) 50 MG tablet Take 1 tablet (50 mg total) by mouth once daily. 90 tablet 11    tamsulosin (FLOMAX) 0.4 mg Cap Take 0.4 mg by mouth once daily.      tiZANidine (ZANAFLEX) 4 MG tablet TAKE 1 TABLET BY MOUTH EVERY 6 HOURS AS NEEDED 90 tablet 0    zolpidem (AMBIEN) 5 MG Tab Take 1 tablet (5 mg total) by mouth nightly as needed. 90 tablet 1     No current facility-administered medications for this visit.        Physical Exam   Constitutional: She is oriented to person, place, and time. She appears well-developed and well-nourished. No distress.   HENT:   Head: Normocephalic and atraumatic.   Eyes: Pupils are equal, round, and reactive to light. EOM are normal.   Neck: Normal range of motion. Neck supple.   Cardiovascular: Normal rate and regular rhythm.   Pulmonary/Chest: Effort normal and breath sounds normal.   Musculoskeletal: Normal range of motion.   Neurological: She is alert and oriented to person, place, and time.   Skin: Skin is warm and dry. No rash noted.   Psychiatric: She has a normal mood and affect. Judgment normal.   Nursing note and vitals reviewed.       Assessment:       1. Prediabetes        Plan:   Prediabetes    she is not interested in medication  She is going to make significant diet changes and increase cardiovascular activity  Repeat A1C in 3 months    No follow-ups on file.    There are no Patient Instructions on file for this visit.

## 2019-05-22 RX ORDER — LEVOTHYROXINE SODIUM 75 UG/1
TABLET ORAL
Qty: 90 TABLET | Refills: 2 | Status: SHIPPED | OUTPATIENT
Start: 2019-05-22 | End: 2019-10-15 | Stop reason: SDUPTHER

## 2019-05-27 ENCOUNTER — HOSPITAL ENCOUNTER (OUTPATIENT)
Dept: RADIOLOGY | Facility: HOSPITAL | Age: 69
Discharge: HOME | End: 2019-05-27
Attending: UROLOGY
Payer: MEDICARE

## 2019-05-27 DIAGNOSIS — N20.0 RENAL STONE: ICD-10-CM

## 2019-05-27 PROCEDURE — 76770 US RETROPERITONEAL COMPLETE: ICD-10-PCS | Mod: 26,,, | Performed by: RADIOLOGY

## 2019-05-27 PROCEDURE — 74018 RADEX ABDOMEN 1 VIEW: CPT | Mod: 26,,, | Performed by: RADIOLOGY

## 2019-05-27 PROCEDURE — 76770 US EXAM ABDO BACK WALL COMP: CPT | Mod: TC,PO

## 2019-05-27 PROCEDURE — 76770 US EXAM ABDO BACK WALL COMP: CPT | Mod: 26,,, | Performed by: RADIOLOGY

## 2019-05-27 PROCEDURE — 74018 RADEX ABDOMEN 1 VIEW: CPT | Mod: TC,PO

## 2019-05-27 PROCEDURE — 74018 XR ABDOMEN AP 1 VIEW: ICD-10-PCS | Mod: 26,,, | Performed by: RADIOLOGY

## 2019-05-30 ENCOUNTER — OFFICE VISIT (OUTPATIENT)
Dept: UROLOGY | Facility: CLINIC | Age: 69
End: 2019-05-30
Payer: MEDICARE

## 2019-05-30 VITALS
DIASTOLIC BLOOD PRESSURE: 78 MMHG | HEIGHT: 62 IN | SYSTOLIC BLOOD PRESSURE: 126 MMHG | BODY MASS INDEX: 33.95 KG/M2 | WEIGHT: 184.5 LBS

## 2019-05-30 DIAGNOSIS — N20.0 RENAL STONE: Primary | ICD-10-CM

## 2019-05-30 PROCEDURE — 99024 PR POST-OP FOLLOW-UP VISIT: ICD-10-PCS | Mod: S$GLB,,, | Performed by: UROLOGY

## 2019-05-30 PROCEDURE — 99024 POSTOP FOLLOW-UP VISIT: CPT | Mod: S$GLB,,, | Performed by: UROLOGY

## 2019-05-30 PROCEDURE — 82365 CALCULUS SPECTROSCOPY: CPT

## 2019-05-30 PROCEDURE — 99999 PR PBB SHADOW E&M-EST. PATIENT-LVL III: ICD-10-PCS | Mod: PBBFAC,,, | Performed by: UROLOGY

## 2019-05-30 PROCEDURE — 99999 PR PBB SHADOW E&M-EST. PATIENT-LVL III: CPT | Mod: PBBFAC,,, | Performed by: UROLOGY

## 2019-05-30 NOTE — PROGRESS NOTES
Chief Complaint: Hematuria    HPI:   5/30/19: ESWL about 3 wks ago and no gross hematuria since the day after.  Has some fragments today.  KUB shows stone persist for the most part.  3/14/19: 69 yo woman worked up for gross hematuria at the end of last year with Dr. Deras.  Has had recurrent gross hematuria in the last few months.  Ct RSS shows a 5mm right lower pole stone.  Cysto was normal.  -UTI 1/19.  Last week had 3d of pink/red in the toilet water.  Takes plavix since having cardiac stent in 2012.  Had an anaphylactic reaction to IV dye given at time of cardiac workup/MRI.  Had another cardiac stent last fall with IV contrast again and had some reaction after, took a lot of benadryl at the time to improve things.  Says it happens more when cleaning house really hard.    Allergies:  Iodine and iodide containing products; Ativan [lorazepam]; Avelox [moxifloxacin]; Codeine; Doxycycline; and Sulfa (sulfonamide antibiotics)    Medications: has a current medication list which includes the following prescription(s): atorvastatin, carvedilol, cetirizine, clopidogrel, docusate sodium, ergocalciferol, esomeprazole, fish oil-omega-3 fatty acids, l. rhamnosus gg/inulin, levothyroxine, melatonin, sertraline, tamsulosin, tizanidine, and zolpidem.    Review of Systems:  General: No fever, chills, fatigability, or weight loss.  Skin: No rashes, itching, or changes in color or texture of skin.  Chest: Denies BRASWELL, cyanosis, wheezing, cough, and sputum production.  Abdomen: Appetite fine. No weight loss. Denies diarrhea, abdominal pain, hematemesis, or blood in stool.  Musculoskeletal: No joint stiffness or swelling. Denies back pain.  : As above.  All other review of systems negative.    PMH:   has a past medical history of Anticoagulant long-term use, Anxiety state (9/28/2010), CAD (coronary artery disease), Depression, Dyslipidemia, Encounter for blood transfusion, GERD (gastroesophageal reflux disease), Hypothyroidism  (8/17/2012), Kidney stone, Kidney stone, Myocardial infarction (2012), Nephrolithiasis, Osteopenia (2013), S/P coronary artery stent placement, Thyroid disease, and Vitamin D deficiency.    PSH:   has a past surgical history that includes Cholecystectomy; Mini-laparotomy; Ectopic pregnancy surgery (Left); Coronary angioplasty with stent (2012); Colonoscopy w/ polypectomy (10/16/2007); Colonoscopy (~2000); Colonoscopy (N/A, 4/4/2018); Cardiac surgery; Esophagogastroduodenoscopy (09/16/2008); Esophagogastroduodenoscopy (11/29/2011); Esophagogastroduodenoscopy (N/A, 7/23/2018); Left heart catheterization (Left, 10/29/2018); Coronary stent placement (N/A, 10/29/2018); and Extracorporeal shock wave lithotripsy (Right, 5/7/2019).    FamHx: family history includes Breast cancer in her mother; Heart disease in her father and mother; Hyperlipidemia in her father and mother; Hypertension in her father and mother; Melanoma in her brother; Nephrolithiasis in her father; Ovarian cancer in her sister; Prostate cancer in her father.    SocHx:  reports that she has quit smoking. Her smoking use included cigarettes. She has never used smokeless tobacco. She reports that she drinks about 0.6 oz of alcohol per week. She reports that she does not use drugs.     Physical Exam:  Vitals:   Vitals:    05/30/19 1410   BP: 126/78     General: A&Ox3. No apparent distress. No deformities.  Neck: No masses. Normal thyroid.  Lungs: normal inspiration. No use of accessory muscles.  Heart: normal pulse. No arrhythmias.  Abdomen: Soft. NT. ND.  Skin: The skin is warm and dry. No jaundice.  Ext: No c/c/e.  : external genitalia normal    Labs/Studies: Urinalysis performed in clinic, summary: UA normal, pH 7    Impression/Plan:   1. No more hematuria, but stone persists.  KUB/US in 6 mo.

## 2019-05-31 ENCOUNTER — EXTERNAL CHRONIC CARE MANAGEMENT (OUTPATIENT)
Dept: PRIMARY CARE CLINIC | Facility: CLINIC | Age: 69
End: 2019-05-31
Payer: MEDICARE

## 2019-05-31 PROCEDURE — 99490 PR CHRONIC CARE MGMT, 1ST 20 MIN: ICD-10-PCS | Mod: S$GLB,,, | Performed by: FAMILY MEDICINE

## 2019-05-31 PROCEDURE — 99490 CHRNC CARE MGMT STAFF 1ST 20: CPT | Mod: S$GLB,,, | Performed by: FAMILY MEDICINE

## 2019-06-04 LAB
ANNOTATION COMMENT IMP: NORMAL
COMPN STONE: NORMAL
SPECIMEN SOURCE: NORMAL
STONE ANALYSIS IR-IMP: NORMAL

## 2019-06-30 ENCOUNTER — EXTERNAL CHRONIC CARE MANAGEMENT (OUTPATIENT)
Dept: PRIMARY CARE CLINIC | Facility: CLINIC | Age: 69
End: 2019-06-30
Payer: MEDICARE

## 2019-06-30 PROCEDURE — 99490 PR CHRONIC CARE MGMT, 1ST 20 MIN: ICD-10-PCS | Mod: S$GLB,,, | Performed by: FAMILY MEDICINE

## 2019-06-30 PROCEDURE — 99490 CHRNC CARE MGMT STAFF 1ST 20: CPT | Mod: S$GLB,,, | Performed by: FAMILY MEDICINE

## 2019-07-31 ENCOUNTER — EXTERNAL CHRONIC CARE MANAGEMENT (OUTPATIENT)
Dept: PRIMARY CARE CLINIC | Facility: CLINIC | Age: 69
End: 2019-07-31
Payer: MEDICARE

## 2019-07-31 DIAGNOSIS — F51.01 PRIMARY INSOMNIA: ICD-10-CM

## 2019-07-31 PROCEDURE — 99490 CHRNC CARE MGMT STAFF 1ST 20: CPT | Mod: S$GLB,,, | Performed by: FAMILY MEDICINE

## 2019-07-31 PROCEDURE — 99490 PR CHRONIC CARE MGMT, 1ST 20 MIN: ICD-10-PCS | Mod: S$GLB,,, | Performed by: FAMILY MEDICINE

## 2019-07-31 RX ORDER — ZOLPIDEM TARTRATE 5 MG/1
TABLET ORAL
Qty: 90 TABLET | Refills: 1 | Status: SHIPPED | OUTPATIENT
Start: 2019-07-31 | End: 2020-03-09

## 2019-08-12 ENCOUNTER — PATIENT MESSAGE (OUTPATIENT)
Dept: FAMILY MEDICINE | Facility: CLINIC | Age: 69
End: 2019-08-12

## 2019-08-12 RX ORDER — NITROGLYCERIN 80 MG/1
PATCH TRANSDERMAL
COMMUNITY
Start: 2012-10-31 | End: 2019-08-12 | Stop reason: SDUPTHER

## 2019-08-12 RX ORDER — NITROGLYCERIN 80 MG/1
1 PATCH TRANSDERMAL DAILY
Qty: 90 PATCH | Refills: 3 | Status: SHIPPED | OUTPATIENT
Start: 2019-08-12 | End: 2019-11-15

## 2019-08-14 RX ORDER — CARVEDILOL 3.12 MG/1
3.12 TABLET ORAL 2 TIMES DAILY WITH MEALS
Qty: 180 TABLET | Refills: 11 | Status: SHIPPED | OUTPATIENT
Start: 2019-08-14 | End: 2019-11-15 | Stop reason: SDUPTHER

## 2019-08-31 ENCOUNTER — EXTERNAL CHRONIC CARE MANAGEMENT (OUTPATIENT)
Dept: PRIMARY CARE CLINIC | Facility: CLINIC | Age: 69
End: 2019-08-31
Payer: MEDICARE

## 2019-08-31 PROCEDURE — 99490 CHRNC CARE MGMT STAFF 1ST 20: CPT | Mod: S$GLB,,, | Performed by: FAMILY MEDICINE

## 2019-08-31 PROCEDURE — 99490 PR CHRONIC CARE MGMT, 1ST 20 MIN: ICD-10-PCS | Mod: S$GLB,,, | Performed by: FAMILY MEDICINE

## 2019-09-30 ENCOUNTER — EXTERNAL CHRONIC CARE MANAGEMENT (OUTPATIENT)
Dept: PRIMARY CARE CLINIC | Facility: CLINIC | Age: 69
End: 2019-09-30
Payer: MEDICARE

## 2019-09-30 PROCEDURE — 99490 CHRNC CARE MGMT STAFF 1ST 20: CPT | Mod: S$GLB,,, | Performed by: FAMILY MEDICINE

## 2019-09-30 PROCEDURE — 99490 PR CHRONIC CARE MGMT, 1ST 20 MIN: ICD-10-PCS | Mod: S$GLB,,, | Performed by: FAMILY MEDICINE

## 2019-10-15 RX ORDER — LEVOTHYROXINE SODIUM 75 UG/1
TABLET ORAL
Qty: 90 TABLET | Refills: 2 | Status: SHIPPED | OUTPATIENT
Start: 2019-10-15 | End: 2019-11-15 | Stop reason: SDUPTHER

## 2019-10-15 RX ORDER — ERGOCALCIFEROL 1.25 MG/1
CAPSULE ORAL
Qty: 24 CAPSULE | Refills: 3 | Status: SHIPPED | OUTPATIENT
Start: 2019-10-15 | End: 2019-11-15 | Stop reason: SDUPTHER

## 2019-10-16 RX ORDER — CLOPIDOGREL BISULFATE 75 MG/1
TABLET ORAL
Qty: 90 TABLET | Refills: 3 | Status: SHIPPED | OUTPATIENT
Start: 2019-10-16 | End: 2019-11-15 | Stop reason: SDUPTHER

## 2019-10-31 ENCOUNTER — EXTERNAL CHRONIC CARE MANAGEMENT (OUTPATIENT)
Dept: PRIMARY CARE CLINIC | Facility: CLINIC | Age: 69
End: 2019-10-31
Payer: MEDICARE

## 2019-10-31 PROCEDURE — 99490 PR CHRONIC CARE MGMT, 1ST 20 MIN: ICD-10-PCS | Mod: S$GLB,,, | Performed by: FAMILY MEDICINE

## 2019-10-31 PROCEDURE — 99490 CHRNC CARE MGMT STAFF 1ST 20: CPT | Mod: S$GLB,,, | Performed by: FAMILY MEDICINE

## 2019-11-15 ENCOUNTER — OFFICE VISIT (OUTPATIENT)
Dept: FAMILY MEDICINE | Facility: CLINIC | Age: 69
End: 2019-11-15
Payer: MEDICARE

## 2019-11-15 ENCOUNTER — PES CALL (OUTPATIENT)
Dept: ADMINISTRATIVE | Facility: CLINIC | Age: 69
End: 2019-11-15

## 2019-11-15 VITALS
WEIGHT: 176.81 LBS | HEART RATE: 82 BPM | BODY MASS INDEX: 32.54 KG/M2 | SYSTOLIC BLOOD PRESSURE: 119 MMHG | HEIGHT: 62 IN | DIASTOLIC BLOOD PRESSURE: 66 MMHG

## 2019-11-15 DIAGNOSIS — R73.9 HYPERGLYCEMIA: Primary | ICD-10-CM

## 2019-11-15 DIAGNOSIS — E55.9 VITAMIN D DEFICIENCY: ICD-10-CM

## 2019-11-15 DIAGNOSIS — I25.10 CORONARY ARTERY DISEASE INVOLVING NATIVE CORONARY ARTERY OF NATIVE HEART WITHOUT ANGINA PECTORIS: ICD-10-CM

## 2019-11-15 DIAGNOSIS — E03.9 HYPOTHYROIDISM, UNSPECIFIED TYPE: Chronic | ICD-10-CM

## 2019-11-15 DIAGNOSIS — E78.5 DYSLIPIDEMIA: ICD-10-CM

## 2019-11-15 DIAGNOSIS — I10 ESSENTIAL HYPERTENSION: ICD-10-CM

## 2019-11-15 PROCEDURE — 3074F PR MOST RECENT SYSTOLIC BLOOD PRESSURE < 130 MM HG: ICD-10-PCS | Mod: S$GLB,,, | Performed by: FAMILY MEDICINE

## 2019-11-15 PROCEDURE — 3074F SYST BP LT 130 MM HG: CPT | Mod: S$GLB,,, | Performed by: FAMILY MEDICINE

## 2019-11-15 PROCEDURE — 1101F PT FALLS ASSESS-DOCD LE1/YR: CPT | Mod: S$GLB,,, | Performed by: FAMILY MEDICINE

## 2019-11-15 PROCEDURE — 99999 PR PBB SHADOW E&M-EST. PATIENT-LVL III: ICD-10-PCS | Mod: PBBFAC,,, | Performed by: FAMILY MEDICINE

## 2019-11-15 PROCEDURE — 99214 OFFICE O/P EST MOD 30 MIN: CPT | Mod: S$GLB,,, | Performed by: FAMILY MEDICINE

## 2019-11-15 PROCEDURE — 99999 PR PBB SHADOW E&M-EST. PATIENT-LVL III: CPT | Mod: PBBFAC,,, | Performed by: FAMILY MEDICINE

## 2019-11-15 PROCEDURE — 1101F PR PT FALLS ASSESS DOC 0-1 FALLS W/OUT INJ PAST YR: ICD-10-PCS | Mod: S$GLB,,, | Performed by: FAMILY MEDICINE

## 2019-11-15 PROCEDURE — 3078F DIAST BP <80 MM HG: CPT | Mod: S$GLB,,, | Performed by: FAMILY MEDICINE

## 2019-11-15 PROCEDURE — 99214 PR OFFICE/OUTPT VISIT, EST, LEVL IV, 30-39 MIN: ICD-10-PCS | Mod: S$GLB,,, | Performed by: FAMILY MEDICINE

## 2019-11-15 PROCEDURE — 3078F PR MOST RECENT DIASTOLIC BLOOD PRESSURE < 80 MM HG: ICD-10-PCS | Mod: S$GLB,,, | Performed by: FAMILY MEDICINE

## 2019-11-15 RX ORDER — ERGOCALCIFEROL 1.25 MG/1
CAPSULE ORAL
Qty: 24 CAPSULE | Refills: 3 | Status: SHIPPED | OUTPATIENT
Start: 2019-11-15 | End: 2020-01-24

## 2019-11-15 RX ORDER — CARVEDILOL 3.12 MG/1
3.12 TABLET ORAL 2 TIMES DAILY WITH MEALS
Qty: 180 TABLET | Refills: 3 | Status: SHIPPED | OUTPATIENT
Start: 2019-11-15 | End: 2020-03-09

## 2019-11-15 RX ORDER — LEVOTHYROXINE SODIUM 75 UG/1
TABLET ORAL
Qty: 90 TABLET | Refills: 3 | Status: SHIPPED | OUTPATIENT
Start: 2019-11-15 | End: 2020-01-24 | Stop reason: SDUPTHER

## 2019-11-15 RX ORDER — CLOPIDOGREL BISULFATE 75 MG/1
TABLET ORAL
Qty: 90 TABLET | Refills: 3 | Status: SHIPPED | OUTPATIENT
Start: 2019-11-15

## 2019-11-15 RX ORDER — ATORVASTATIN CALCIUM 10 MG/1
10 TABLET, FILM COATED ORAL DAILY
Qty: 90 TABLET | Refills: 3 | Status: SHIPPED | OUTPATIENT
Start: 2019-11-15 | End: 2020-03-09

## 2019-11-15 RX ORDER — NITROGLYCERIN 0.3 MG/1
0.3 TABLET SUBLINGUAL EVERY 5 MIN PRN
Qty: 50 TABLET | Refills: 11 | Status: SHIPPED | OUTPATIENT
Start: 2019-11-15 | End: 2020-03-09

## 2019-11-18 ENCOUNTER — LAB VISIT (OUTPATIENT)
Dept: LAB | Facility: HOSPITAL | Age: 69
End: 2019-11-18
Attending: FAMILY MEDICINE
Payer: MEDICARE

## 2019-11-18 DIAGNOSIS — E55.9 VITAMIN D DEFICIENCY: ICD-10-CM

## 2019-11-18 DIAGNOSIS — E78.5 DYSLIPIDEMIA: ICD-10-CM

## 2019-11-18 DIAGNOSIS — I10 ESSENTIAL HYPERTENSION: ICD-10-CM

## 2019-11-18 LAB
25(OH)D3+25(OH)D2 SERPL-MCNC: 38 NG/ML (ref 30–96)
ALBUMIN SERPL BCP-MCNC: 3.9 G/DL (ref 3.5–5.2)
ALP SERPL-CCNC: 105 U/L (ref 55–135)
ALT SERPL W/O P-5'-P-CCNC: 19 U/L (ref 10–44)
ANION GAP SERPL CALC-SCNC: 7 MMOL/L (ref 8–16)
AST SERPL-CCNC: 20 U/L (ref 10–40)
BASOPHILS # BLD AUTO: 0.06 K/UL (ref 0–0.2)
BASOPHILS NFR BLD: 0.9 % (ref 0–1.9)
BILIRUB SERPL-MCNC: 0.6 MG/DL (ref 0.1–1)
BUN SERPL-MCNC: 13 MG/DL (ref 8–23)
CALCIUM SERPL-MCNC: 9.4 MG/DL (ref 8.7–10.5)
CHLORIDE SERPL-SCNC: 105 MMOL/L (ref 95–110)
CHOLEST SERPL-MCNC: 173 MG/DL (ref 120–199)
CHOLEST/HDLC SERPL: 3.4 {RATIO} (ref 2–5)
CO2 SERPL-SCNC: 27 MMOL/L (ref 23–29)
CREAT SERPL-MCNC: 0.8 MG/DL (ref 0.5–1.4)
DIFFERENTIAL METHOD: ABNORMAL
EOSINOPHIL # BLD AUTO: 0.1 K/UL (ref 0–0.5)
EOSINOPHIL NFR BLD: 2.1 % (ref 0–8)
ERYTHROCYTE [DISTWIDTH] IN BLOOD BY AUTOMATED COUNT: 14.2 % (ref 11.5–14.5)
EST. GFR  (AFRICAN AMERICAN): >60 ML/MIN/1.73 M^2
EST. GFR  (NON AFRICAN AMERICAN): >60 ML/MIN/1.73 M^2
GLUCOSE SERPL-MCNC: 129 MG/DL (ref 70–110)
HCT VFR BLD AUTO: 44.7 % (ref 37–48.5)
HDLC SERPL-MCNC: 51 MG/DL (ref 40–75)
HDLC SERPL: 29.5 % (ref 20–50)
HGB BLD-MCNC: 14.1 G/DL (ref 12–16)
IMM GRANULOCYTES # BLD AUTO: 0.01 K/UL (ref 0–0.04)
IMM GRANULOCYTES NFR BLD AUTO: 0.1 % (ref 0–0.5)
LDLC SERPL CALC-MCNC: 100.4 MG/DL (ref 63–159)
LYMPHOCYTES # BLD AUTO: 2.1 K/UL (ref 1–4.8)
LYMPHOCYTES NFR BLD: 30.8 % (ref 18–48)
MCH RBC QN AUTO: 30.4 PG (ref 27–31)
MCHC RBC AUTO-ENTMCNC: 31.5 G/DL (ref 32–36)
MCV RBC AUTO: 96 FL (ref 82–98)
MONOCYTES # BLD AUTO: 0.6 K/UL (ref 0.3–1)
MONOCYTES NFR BLD: 9.3 % (ref 4–15)
NEUTROPHILS # BLD AUTO: 3.8 K/UL (ref 1.8–7.7)
NEUTROPHILS NFR BLD: 56.8 % (ref 38–73)
NONHDLC SERPL-MCNC: 122 MG/DL
NRBC BLD-RTO: 0 /100 WBC
PLATELET # BLD AUTO: 281 K/UL (ref 150–350)
PMV BLD AUTO: 11.7 FL (ref 9.2–12.9)
POTASSIUM SERPL-SCNC: 4.3 MMOL/L (ref 3.5–5.1)
PROT SERPL-MCNC: 6.6 G/DL (ref 6–8.4)
RBC # BLD AUTO: 4.64 M/UL (ref 4–5.4)
SODIUM SERPL-SCNC: 139 MMOL/L (ref 136–145)
TRIGL SERPL-MCNC: 108 MG/DL (ref 30–150)
TSH SERPL DL<=0.005 MIU/L-ACNC: 3.26 UIU/ML (ref 0.4–4)
WBC # BLD AUTO: 6.68 K/UL (ref 3.9–12.7)

## 2019-11-18 PROCEDURE — 36415 COLL VENOUS BLD VENIPUNCTURE: CPT | Mod: PO

## 2019-11-18 PROCEDURE — 80061 LIPID PANEL: CPT

## 2019-11-18 PROCEDURE — 84443 ASSAY THYROID STIM HORMONE: CPT

## 2019-11-18 PROCEDURE — 85025 COMPLETE CBC W/AUTO DIFF WBC: CPT

## 2019-11-18 PROCEDURE — 82306 VITAMIN D 25 HYDROXY: CPT

## 2019-11-18 PROCEDURE — 80053 COMPREHEN METABOLIC PANEL: CPT

## 2019-11-18 NOTE — PROGRESS NOTES
I have reviewed the CBC which is a comprehensive review of the blood count, white count and differential of white cells.  All of the findings are acceptable at this time and no significant changes are noted that would indicate that other testing is indicated based on that.

## 2019-11-19 ENCOUNTER — OFFICE VISIT (OUTPATIENT)
Dept: CARDIOLOGY | Facility: CLINIC | Age: 69
End: 2019-11-19
Payer: MEDICARE

## 2019-11-19 VITALS
DIASTOLIC BLOOD PRESSURE: 76 MMHG | HEIGHT: 62 IN | BODY MASS INDEX: 32.33 KG/M2 | WEIGHT: 175.69 LBS | HEART RATE: 72 BPM | SYSTOLIC BLOOD PRESSURE: 125 MMHG

## 2019-11-19 DIAGNOSIS — I10 ESSENTIAL HYPERTENSION: ICD-10-CM

## 2019-11-19 DIAGNOSIS — I25.10 CORONARY ARTERY DISEASE INVOLVING NATIVE CORONARY ARTERY OF NATIVE HEART WITHOUT ANGINA PECTORIS: Primary | ICD-10-CM

## 2019-11-19 DIAGNOSIS — E78.5 DYSLIPIDEMIA: ICD-10-CM

## 2019-11-19 DIAGNOSIS — Z95.5 S/P CORONARY ARTERY STENT PLACEMENT: ICD-10-CM

## 2019-11-19 PROCEDURE — 99999 PR PBB SHADOW E&M-EST. PATIENT-LVL III: ICD-10-PCS | Mod: PBBFAC,,, | Performed by: INTERNAL MEDICINE

## 2019-11-19 PROCEDURE — 99999 PR PBB SHADOW E&M-EST. PATIENT-LVL III: CPT | Mod: PBBFAC,,, | Performed by: INTERNAL MEDICINE

## 2019-11-19 PROCEDURE — 3078F DIAST BP <80 MM HG: CPT | Mod: S$GLB,,, | Performed by: INTERNAL MEDICINE

## 2019-11-19 PROCEDURE — 3078F PR MOST RECENT DIASTOLIC BLOOD PRESSURE < 80 MM HG: ICD-10-PCS | Mod: S$GLB,,, | Performed by: INTERNAL MEDICINE

## 2019-11-19 PROCEDURE — 99214 OFFICE O/P EST MOD 30 MIN: CPT | Mod: S$GLB,,, | Performed by: INTERNAL MEDICINE

## 2019-11-19 PROCEDURE — 1101F PR PT FALLS ASSESS DOC 0-1 FALLS W/OUT INJ PAST YR: ICD-10-PCS | Mod: S$GLB,,, | Performed by: INTERNAL MEDICINE

## 2019-11-19 PROCEDURE — 1101F PT FALLS ASSESS-DOCD LE1/YR: CPT | Mod: S$GLB,,, | Performed by: INTERNAL MEDICINE

## 2019-11-19 PROCEDURE — 99214 PR OFFICE/OUTPT VISIT, EST, LEVL IV, 30-39 MIN: ICD-10-PCS | Mod: S$GLB,,, | Performed by: INTERNAL MEDICINE

## 2019-11-19 PROCEDURE — 3074F SYST BP LT 130 MM HG: CPT | Mod: S$GLB,,, | Performed by: INTERNAL MEDICINE

## 2019-11-19 PROCEDURE — 3074F PR MOST RECENT SYSTOLIC BLOOD PRESSURE < 130 MM HG: ICD-10-PCS | Mod: S$GLB,,, | Performed by: INTERNAL MEDICINE

## 2019-11-19 NOTE — PROGRESS NOTES
Subjective:    Patient ID:  Severiano Downing is a 69 y.o. female who presents for follow-up of cad    HPI  She comes with no complaints, no chest pain, no shortness of breath  Occasional dizzy spells as well as low BP  Moving to [place] next year    Review of Systems   Constitution: Negative for decreased appetite, malaise/fatigue, weight gain and weight loss.   Cardiovascular: Negative for chest pain, dyspnea on exertion, leg swelling, palpitations and syncope.   Respiratory: Negative for cough and shortness of breath.    Gastrointestinal: Negative.    Neurological: Negative for weakness.   All other systems reviewed and are negative.       Objective:      Physical Exam   Constitutional: She is oriented to person, place, and time. She appears well-developed and well-nourished.   HENT:   Head: Normocephalic.   Eyes: Pupils are equal, round, and reactive to light.   Neck: Normal range of motion. Neck supple. No JVD present. Carotid bruit is not present. No thyromegaly present.   Cardiovascular: Normal rate, regular rhythm, normal heart sounds, intact distal pulses and normal pulses. PMI is not displaced. Exam reveals no gallop.   No murmur heard.  Pulmonary/Chest: Effort normal and breath sounds normal.   Abdominal: Soft. Normal appearance. She exhibits no mass. There is no hepatosplenomegaly. There is no tenderness.   Musculoskeletal: Normal range of motion. She exhibits no edema.   Neurological: She is alert and oriented to person, place, and time. She has normal strength and normal reflexes. No sensory deficit.   Skin: Skin is warm and intact.   Psychiatric: She has a normal mood and affect.   Nursing note and vitals reviewed.        Assessment:       1. Coronary artery disease involving native coronary artery of native heart without angina pectoris    2. S/P coronary artery stent placement    3. Dyslipidemia    4. Essential hypertension         Plan:   plavix qod stop coreg  Continue all other cardiac  medications  Regular exercise program  Weight loss  1 yr f/u prn

## 2019-11-20 DIAGNOSIS — Z13.1 SCREENING FOR DIABETES MELLITUS: Primary | ICD-10-CM

## 2019-11-20 DIAGNOSIS — R73.9 HYPERGLYCEMIA: ICD-10-CM

## 2019-11-20 RX ORDER — METFORMIN HYDROCHLORIDE 500 MG/1
500 TABLET ORAL DAILY
Qty: 90 TABLET | Refills: 0 | Status: SHIPPED | OUTPATIENT
Start: 2019-11-20 | End: 2020-02-11 | Stop reason: SDUPTHER

## 2019-11-20 NOTE — PROGRESS NOTES
The glucose was high at 129.  126 and above is usually considered to be diabetes.    The LDL, Vitamin D and thyroid are all normal.    Based on the above labs, I recommend that we use metformin 500 mg once a day #30 and rf x 2 and recheck the a1c and glucose in 3 months.

## 2019-11-20 NOTE — TELEPHONE ENCOUNTER
I have signed for the following orders AND/OR meds.  Please call the patient and ask the patient to schedule the testing AND/OR inform about any medications that were sent.      Orders Placed This Encounter   Procedures    Hemoglobin A1c     Standing Status:   Future     Standing Expiration Date:   1/18/2021       Medications Ordered This Encounter   Medications    metFORMIN (GLUCOPHAGE) 500 MG tablet     Sig: Take 1 tablet (500 mg total) by mouth once daily.     Dispense:  90 tablet     Refill:  0

## 2019-11-20 NOTE — TELEPHONE ENCOUNTER
----- Message from Kian Belcher MD sent at 11/19/2019  7:30 PM CST -----  The glucose was high at 129.  126 and above is usually considered to be diabetes.    The LDL, Vitamin D and thyroid are all normal.    Based on the above labs, I recommend that we use metformin 500 mg once a day #30 and rf x 2 and recheck the a1c and glucose in 3 months.

## 2019-11-20 NOTE — TELEPHONE ENCOUNTER
Spoke with patient about results and recommendations.  Medication and lab has been pended to you for your approval.

## 2019-11-21 NOTE — TELEPHONE ENCOUNTER
I have signed for the following orders AND/OR meds.  Please call the patient and ask the patient to schedule the testing AND/OR inform about any medications that were sent.      Orders Placed This Encounter   Procedures    Hemoglobin A1c     Standing Status:   Future     Standing Expiration Date:   1/18/2021    Glucose, fasting     Standing Status:   Future     Standing Expiration Date:   1/18/2021       Medications Ordered This Encounter   Medications    metFORMIN (GLUCOPHAGE) 500 MG tablet     Sig: Take 1 tablet (500 mg total) by mouth once daily.     Dispense:  90 tablet     Refill:  0

## 2019-11-30 ENCOUNTER — EXTERNAL CHRONIC CARE MANAGEMENT (OUTPATIENT)
Dept: PRIMARY CARE CLINIC | Facility: CLINIC | Age: 69
End: 2019-11-30
Payer: MEDICARE

## 2019-11-30 PROCEDURE — 99490 PR CHRONIC CARE MGMT, 1ST 20 MIN: ICD-10-PCS | Mod: S$GLB,,, | Performed by: FAMILY MEDICINE

## 2019-11-30 PROCEDURE — 99490 CHRNC CARE MGMT STAFF 1ST 20: CPT | Mod: S$GLB,,, | Performed by: FAMILY MEDICINE

## 2019-12-17 ENCOUNTER — LAB VISIT (OUTPATIENT)
Dept: LAB | Facility: HOSPITAL | Age: 69
End: 2019-12-17
Attending: FAMILY MEDICINE
Payer: MEDICARE

## 2019-12-17 ENCOUNTER — TELEPHONE (OUTPATIENT)
Dept: FAMILY MEDICINE | Facility: CLINIC | Age: 69
End: 2019-12-17

## 2019-12-17 DIAGNOSIS — R30.0 DYSURIA: Primary | ICD-10-CM

## 2019-12-17 DIAGNOSIS — R30.0 DYSURIA: ICD-10-CM

## 2019-12-17 LAB
BILIRUB UR QL STRIP: NEGATIVE
BILIRUB UR QL STRIP: NEGATIVE
CLARITY UR: CLEAR
CLARITY UR: CLEAR
COLOR UR: YELLOW
COLOR UR: YELLOW
GLUCOSE UR QL STRIP: NEGATIVE
GLUCOSE UR QL STRIP: NEGATIVE
HGB UR QL STRIP: NEGATIVE
HGB UR QL STRIP: NEGATIVE
KETONES UR QL STRIP: NEGATIVE
KETONES UR QL STRIP: NEGATIVE
LEUKOCYTE ESTERASE UR QL STRIP: NEGATIVE
LEUKOCYTE ESTERASE UR QL STRIP: NEGATIVE
NITRITE UR QL STRIP: NEGATIVE
NITRITE UR QL STRIP: NEGATIVE
PH UR STRIP: 6 [PH] (ref 5–8)
PH UR STRIP: 6 [PH] (ref 5–8)
PROT UR QL STRIP: NEGATIVE
PROT UR QL STRIP: NEGATIVE
SP GR UR STRIP: 1.01 (ref 1–1.03)
SP GR UR STRIP: 1.01 (ref 1–1.03)
URN SPEC COLLECT METH UR: NORMAL
URN SPEC COLLECT METH UR: NORMAL

## 2019-12-17 PROCEDURE — 87086 URINE CULTURE/COLONY COUNT: CPT

## 2019-12-17 PROCEDURE — 81002 URINALYSIS NONAUTO W/O SCOPE: CPT | Mod: PO

## 2019-12-17 NOTE — TELEPHONE ENCOUNTER
----- Message from Frank Woo sent at 12/17/2019  9:08 AM CST -----  Contact: Pt  Please give pt a call at .[phone] (home) she is requesting orders for a urine sample for a possible bladder infection.

## 2019-12-17 NOTE — TELEPHONE ENCOUNTER
I have signed for the following orders AND/OR meds.  Please call the patient and ask the patient to schedule the testing AND/OR inform about any medications that were sent.      Orders Placed This Encounter   Procedures    Urine culture     Standing Status:   Future     Standing Expiration Date:   2/14/2021    Urinalysis     Standing Status:   Future     Standing Expiration Date:   1/17/2020

## 2019-12-17 NOTE — TELEPHONE ENCOUNTER
----- Message from Frank Woo sent at 12/17/2019  9:08 AM CST -----  Contact: Pt  Please give pt a call at .[phone] (home) she is requesting orders for a urine sample for a possible bladder infection.

## 2019-12-18 LAB — BACTERIA UR CULT: NO GROWTH

## 2019-12-31 ENCOUNTER — EXTERNAL CHRONIC CARE MANAGEMENT (OUTPATIENT)
Dept: PRIMARY CARE CLINIC | Facility: CLINIC | Age: 69
End: 2019-12-31
Payer: MEDICARE

## 2019-12-31 PROCEDURE — 99490 CHRNC CARE MGMT STAFF 1ST 20: CPT | Mod: S$GLB,,, | Performed by: FAMILY MEDICINE

## 2019-12-31 PROCEDURE — 99490 PR CHRONIC CARE MGMT, 1ST 20 MIN: ICD-10-PCS | Mod: S$GLB,,, | Performed by: FAMILY MEDICINE

## 2020-01-02 ENCOUNTER — TELEPHONE (OUTPATIENT)
Dept: FAMILY MEDICINE | Facility: CLINIC | Age: 70
End: 2020-01-02

## 2020-01-02 NOTE — TELEPHONE ENCOUNTER
Pt states that she thinks the metformin is making her nauseated. Wants to know does she need to cut down or get off. Please Advise.

## 2020-01-02 NOTE — TELEPHONE ENCOUNTER
----- Message from Lupe Dhaliwal sent at 1/2/2020 11:47 AM CST -----  Patient needs call back to consult with nurse naomi metformin medication..[phone] (home)

## 2020-01-06 NOTE — TELEPHONE ENCOUNTER
Have her hold the metformin for the next 7-10 days to see if her symptoms improve.  She is on the lowest dose (500 mg once daily) so this cannot be decreased. If symptoms resolve we will need to change medication

## 2020-01-20 ENCOUNTER — TELEPHONE (OUTPATIENT)
Dept: ADMINISTRATIVE | Facility: HOSPITAL | Age: 70
End: 2020-01-20

## 2020-01-20 NOTE — TELEPHONE ENCOUNTER
Pt states she is staying in [place] at this time and does not know how long. States she plans to move to [place]. She is asking to cancel lab for 2/21/20 and she will call with lab in [place] that she would like orders faxed to complete lab work if Dr Belcher would like for her to have this done. Please advise.

## 2020-01-23 DIAGNOSIS — E55.9 VITAMIN D DEFICIENCY: ICD-10-CM

## 2020-01-24 ENCOUNTER — TELEPHONE (OUTPATIENT)
Dept: FAMILY MEDICINE | Facility: CLINIC | Age: 70
End: 2020-01-24

## 2020-01-24 RX ORDER — SERTRALINE HYDROCHLORIDE 25 MG/1
TABLET, FILM COATED ORAL
Qty: 90 TABLET | Refills: 1 | Status: SHIPPED | OUTPATIENT
Start: 2020-01-24 | End: 2020-03-09

## 2020-01-24 RX ORDER — SERTRALINE HYDROCHLORIDE 25 MG/1
TABLET, FILM COATED ORAL
Qty: 90 TABLET | Refills: 1 | Status: SHIPPED | OUTPATIENT
Start: 2020-01-24 | End: 2020-01-24

## 2020-01-24 RX ORDER — ERGOCALCIFEROL 1.25 MG/1
CAPSULE ORAL
Qty: 24 CAPSULE | Refills: 3 | Status: SHIPPED | OUTPATIENT
Start: 2020-01-24 | End: 2020-03-09

## 2020-01-24 RX ORDER — LEVOTHYROXINE SODIUM 75 UG/1
TABLET ORAL
Qty: 90 TABLET | Refills: 3 | Status: SHIPPED | OUTPATIENT
Start: 2020-01-24 | End: 2021-01-03

## 2020-01-24 NOTE — TELEPHONE ENCOUNTER
The patient is requesting vitamin-D at a dose of 1 tablet a day.  I have her listed as taking 1 pill twice a week.  Please confirm that she is not taking it every day.

## 2020-01-24 NOTE — TELEPHONE ENCOUNTER
----- Message from Jose Raul Aranda sent at 1/24/2020 11:46 AM CST -----  Contact: [facility]  Type:  Pharmacy Calling to Clarify an RX    Name of Caller:wen  Pharmacy Name:[facility]  Prescription Name:sertraline   What do they need to clarify?:clarify of directions  Best Call Back Number:[phone]  Additional Information: none    Thanks,  Jose Raul Aranda

## 2020-01-24 NOTE — TELEPHONE ENCOUNTER
Pharmacy stated that the directions for pt's Sertraline needed to be specified. They aren't able to fill the rx because it states use as directed. They needed it to be more specific/pls advise/thanks.

## 2020-01-24 NOTE — TELEPHONE ENCOUNTER
I have signed for the following orders AND/OR meds.  Please call the patient and ask the patient to schedule the testing AND/OR inform about any medications that were sent.      No orders of the defined types were placed in this encounter.      Medications Ordered This Encounter   Medications    sertraline (ZOLOFT) 25 MG tablet     Sig: Use 1 p.o. q.d.     Dispense:  90 tablet     Refill:  1

## 2020-01-31 ENCOUNTER — EXTERNAL CHRONIC CARE MANAGEMENT (OUTPATIENT)
Dept: PRIMARY CARE CLINIC | Facility: CLINIC | Age: 70
End: 2020-01-31
Payer: MEDICARE

## 2020-01-31 PROCEDURE — 99490 CHRNC CARE MGMT STAFF 1ST 20: CPT | Mod: S$GLB,,, | Performed by: FAMILY MEDICINE

## 2020-01-31 PROCEDURE — 99490 PR CHRONIC CARE MGMT, 1ST 20 MIN: ICD-10-PCS | Mod: S$GLB,,, | Performed by: FAMILY MEDICINE

## 2020-02-11 RX ORDER — METFORMIN HYDROCHLORIDE 500 MG/1
TABLET ORAL
Qty: 90 TABLET | Refills: 0 | Status: SHIPPED | OUTPATIENT
Start: 2020-02-11 | End: 2020-03-09

## 2020-02-27 ENCOUNTER — LAB VISIT (OUTPATIENT)
Dept: LAB | Facility: HOSPITAL | Age: 70
End: 2020-02-27
Attending: FAMILY MEDICINE
Payer: MEDICARE

## 2020-02-27 DIAGNOSIS — Z13.1 SCREENING FOR DIABETES MELLITUS: ICD-10-CM

## 2020-02-27 DIAGNOSIS — R73.9 HYPERGLYCEMIA: ICD-10-CM

## 2020-02-27 LAB
ESTIMATED AVG GLUCOSE: 120 MG/DL (ref 68–131)
GLUCOSE SERPL-MCNC: 125 MG/DL (ref 70–110)
HBA1C MFR BLD HPLC: 5.8 % (ref 4–5.6)

## 2020-02-27 PROCEDURE — 36415 COLL VENOUS BLD VENIPUNCTURE: CPT | Mod: PO

## 2020-02-27 PROCEDURE — 82947 ASSAY GLUCOSE BLOOD QUANT: CPT

## 2020-02-27 PROCEDURE — 83036 HEMOGLOBIN GLYCOSYLATED A1C: CPT

## 2020-02-29 ENCOUNTER — EXTERNAL CHRONIC CARE MANAGEMENT (OUTPATIENT)
Dept: PRIMARY CARE CLINIC | Facility: CLINIC | Age: 70
End: 2020-02-29
Payer: MEDICARE

## 2020-02-29 PROCEDURE — 99490 CHRNC CARE MGMT STAFF 1ST 20: CPT | Mod: S$GLB,,, | Performed by: FAMILY MEDICINE

## 2020-02-29 PROCEDURE — 99490 PR CHRONIC CARE MGMT, 1ST 20 MIN: ICD-10-PCS | Mod: S$GLB,,, | Performed by: FAMILY MEDICINE

## 2020-03-01 NOTE — PROGRESS NOTES
se. Please use a diabetic diet, exercise, weight loss and recheck the a1c and glucose in 6 months. These numbers still qualify for just being hyperglycemia and not diabetes however the fasting number is very rica

## 2020-03-03 ENCOUNTER — TELEPHONE (OUTPATIENT)
Dept: FAMILY MEDICINE | Facility: CLINIC | Age: 70
End: 2020-03-03

## 2020-03-03 DIAGNOSIS — R73.9 HYPERGLYCEMIA: Primary | ICD-10-CM

## 2020-03-03 NOTE — TELEPHONE ENCOUNTER
----- Message from Kian Belcher MD sent at 3/1/2020  3:33 PM CST -----  se. Please use a diabetic diet, exercise, weight loss and recheck the a1c and glucose in 6 months. These numbers still qualify for just being hyperglycemia and not diabetes however the fasting number is very rica

## 2020-03-03 NOTE — TELEPHONE ENCOUNTER
I have signed for the following orders AND/OR meds.  Please call the patient and ask the patient to schedule the testing AND/OR inform about any medications that were sent.      Orders Placed This Encounter   Procedures    Hemoglobin A1c     Standing Status:   Standing     Number of Occurrences:   99     Standing Expiration Date:   3/3/2040    Glucose, fasting     Standing Status:   Future     Standing Expiration Date:   5/2/2021

## 2020-03-09 ENCOUNTER — OFFICE VISIT (OUTPATIENT)
Dept: FAMILY MEDICINE | Facility: CLINIC | Age: 70
End: 2020-03-09
Payer: MEDICARE

## 2020-03-09 VITALS
HEART RATE: 76 BPM | HEIGHT: 62 IN | TEMPERATURE: 98 F | SYSTOLIC BLOOD PRESSURE: 139 MMHG | DIASTOLIC BLOOD PRESSURE: 84 MMHG | BODY MASS INDEX: 31.65 KG/M2 | WEIGHT: 172 LBS

## 2020-03-09 DIAGNOSIS — J06.9 VIRAL URI WITH COUGH: Primary | ICD-10-CM

## 2020-03-09 PROCEDURE — 3079F PR MOST RECENT DIASTOLIC BLOOD PRESSURE 80-89 MM HG: ICD-10-PCS | Mod: S$GLB,,, | Performed by: FAMILY MEDICINE

## 2020-03-09 PROCEDURE — 1126F PR PAIN SEVERITY QUANTIFIED, NO PAIN PRESENT: ICD-10-PCS | Mod: S$GLB,,, | Performed by: FAMILY MEDICINE

## 2020-03-09 PROCEDURE — 99213 PR OFFICE/OUTPT VISIT, EST, LEVL III, 20-29 MIN: ICD-10-PCS | Mod: S$GLB,,, | Performed by: FAMILY MEDICINE

## 2020-03-09 PROCEDURE — 99999 PR PBB SHADOW E&M-EST. PATIENT-LVL III: ICD-10-PCS | Mod: PBBFAC,,, | Performed by: FAMILY MEDICINE

## 2020-03-09 PROCEDURE — 3075F PR MOST RECENT SYSTOLIC BLOOD PRESS GE 130-139MM HG: ICD-10-PCS | Mod: S$GLB,,, | Performed by: FAMILY MEDICINE

## 2020-03-09 PROCEDURE — 99999 PR PBB SHADOW E&M-EST. PATIENT-LVL III: CPT | Mod: PBBFAC,,, | Performed by: FAMILY MEDICINE

## 2020-03-09 PROCEDURE — 3079F DIAST BP 80-89 MM HG: CPT | Mod: S$GLB,,, | Performed by: FAMILY MEDICINE

## 2020-03-09 PROCEDURE — 1126F AMNT PAIN NOTED NONE PRSNT: CPT | Mod: S$GLB,,, | Performed by: FAMILY MEDICINE

## 2020-03-09 PROCEDURE — 1101F PT FALLS ASSESS-DOCD LE1/YR: CPT | Mod: S$GLB,,, | Performed by: FAMILY MEDICINE

## 2020-03-09 PROCEDURE — 1159F PR MEDICATION LIST DOCUMENTED IN MEDICAL RECORD: ICD-10-PCS | Mod: S$GLB,,, | Performed by: FAMILY MEDICINE

## 2020-03-09 PROCEDURE — 99213 OFFICE O/P EST LOW 20 MIN: CPT | Mod: S$GLB,,, | Performed by: FAMILY MEDICINE

## 2020-03-09 PROCEDURE — 1101F PR PT FALLS ASSESS DOC 0-1 FALLS W/OUT INJ PAST YR: ICD-10-PCS | Mod: S$GLB,,, | Performed by: FAMILY MEDICINE

## 2020-03-09 PROCEDURE — 1159F MED LIST DOCD IN RCRD: CPT | Mod: S$GLB,,, | Performed by: FAMILY MEDICINE

## 2020-03-09 PROCEDURE — 3075F SYST BP GE 130 - 139MM HG: CPT | Mod: S$GLB,,, | Performed by: FAMILY MEDICINE

## 2020-03-09 RX ORDER — BENZONATATE 200 MG/1
200 CAPSULE ORAL 3 TIMES DAILY PRN
Qty: 30 CAPSULE | Refills: 0 | Status: SHIPPED | OUTPATIENT
Start: 2020-03-09

## 2020-03-09 RX ORDER — CETIRIZINE HYDROCHLORIDE 10 MG/1
10 TABLET ORAL DAILY
Refills: 0 | COMMUNITY
Start: 2020-03-09 | End: 2020-03-19

## 2020-03-09 RX ORDER — BENZONATATE 200 MG/1
200 CAPSULE ORAL 3 TIMES DAILY PRN
Qty: 30 CAPSULE | Refills: 0 | Status: SHIPPED | OUTPATIENT
Start: 2020-03-09 | End: 2020-03-09 | Stop reason: SDUPTHER

## 2020-03-09 NOTE — PROGRESS NOTES
"Subjective:      Patient ID: Severiano Downing is a 69 y.o. female.    Chief Complaint: Cough and Results    HPI  She has had a cough for at least 10 days. She feels it is allergic and she has had no fever today.  3 days ago, she felt feverish but has not had that since then. She did have a flight from [place] on 2/27/2020.  She is day 13 from having the flight.  She has not had a wheeze. She has not taken any medicine for this.  She has a small sore throat. No drip in the nose.  No ear pain noted.  No GI symptoms noted. She has had a little headache.  No body aches noted.  Review of Systems    Objective:   /84   Pulse 76   Temp 98.1 °F (36.7 °C) (Oral)   Ht 5' 2" (1.575 m)   Wt 78 kg (172 lb)   BMI 31.46 kg/m²     Physical Exam   Constitutional: She appears well-developed and well-nourished.   HENT:   Head: Normocephalic and atraumatic.   Right Ear: External ear normal.   Left Ear: External ear normal.   Nose: Mucosal edema and rhinorrhea present.   Mouth/Throat: Oropharynx is clear and moist. No oropharyngeal exudate.   Eyes: Pupils are equal, round, and reactive to light. Conjunctivae and EOM are normal. Right eye exhibits no discharge. Left eye exhibits no discharge. No scleral icterus.   Neck: Neck supple. No thyromegaly present.   Cardiovascular: Normal rate, regular rhythm, normal heart sounds and intact distal pulses. Exam reveals no gallop and no friction rub.   No murmur heard.  Pulmonary/Chest: Effort normal and breath sounds normal. No stridor. No respiratory distress. She has no wheezes. She has no rales. She exhibits no tenderness.   Lymphadenopathy:     She has no cervical adenopathy.   Vitals reviewed.      Assessment:     1. Viral URI with cough        Plan:   Severiano was seen today for cough and results.    Diagnoses and all orders for this visit:    Viral URI with cough    Other orders  -     cetirizine (ZYRTEC) 10 MG tablet; Take 1 tablet (10 mg total) by mouth once daily. for 10 " days  -     benzonatate (TESSALON) 200 MG capsule; Take 1 capsule (200 mg total) by mouth 3 (three) times daily as needed for Cough.

## 2020-03-31 ENCOUNTER — EXTERNAL CHRONIC CARE MANAGEMENT (OUTPATIENT)
Dept: PRIMARY CARE CLINIC | Facility: CLINIC | Age: 70
End: 2020-03-31
Payer: MEDICARE

## 2020-03-31 PROCEDURE — 99490 CHRNC CARE MGMT STAFF 1ST 20: CPT | Mod: S$GLB,,, | Performed by: FAMILY MEDICINE

## 2020-03-31 PROCEDURE — 99490 PR CHRONIC CARE MGMT, 1ST 20 MIN: ICD-10-PCS | Mod: S$GLB,,, | Performed by: FAMILY MEDICINE

## 2020-03-31 PROCEDURE — G2058 CCM ADD 20MIN: HCPCS | Mod: S$GLB,,, | Performed by: FAMILY MEDICINE

## 2020-03-31 PROCEDURE — G2058 PR CHRON CARE MGMT, EA ADDTL 20 MINS: ICD-10-PCS | Mod: S$GLB,,, | Performed by: FAMILY MEDICINE

## 2020-11-19 ENCOUNTER — PES CALL (OUTPATIENT)
Dept: ADMINISTRATIVE | Facility: CLINIC | Age: 70
End: 2020-11-19

## 2020-12-17 ENCOUNTER — PATIENT OUTREACH (OUTPATIENT)
Dept: ADMINISTRATIVE | Facility: OTHER | Age: 70
End: 2020-12-17

## 2020-12-17 DIAGNOSIS — Z12.31 BREAST CANCER SCREENING BY MAMMOGRAM: Primary | ICD-10-CM

## 2020-12-17 NOTE — PROGRESS NOTES
LINKS immunization registry not responding  Care Everywhere updated  Health Maintenance updated  DIS/reviewed for overdue Proactive [facility] Encounters ([facility]) health maintenance testing (CRS, Breast Ca, Diabetic Eye Exam)   Orders entered: screening mammogram  Portal message sent to patient with scheduling link for mammogram

## 2021-02-12 ENCOUNTER — PATIENT MESSAGE (OUTPATIENT)
Dept: FAMILY MEDICINE | Facility: CLINIC | Age: 71
End: 2021-02-12

## 2022-11-08 NOTE — TELEPHONE ENCOUNTER
Dr. Sergio Vogel!  Patient will remain on Plavix through 4/29/19. ESWL will be rescheduled to 5/7/19. May she hold Plavix 7 days prior to sx?  QUAN Montenegro  
Move ESWL to 4/30 due to BHARAT.   
Patient may hold Plavix 7 days prior to surgery per Dr. Hill. Patient notified and states understanding.   
DASH Diet/Other Diet Instructions

## 2024-08-01 NOTE — OP NOTE
Date of Procedure: 05/07/2019    PREOPERATIVE DIAGNOSIS:  Right renal stone.    POSTOPERATIVE DIAGNOSIS:  Same.    PROCEDURES:  Right extracorporeal shock wave lithotripsy.    SURGEON:  Denis Velasquez IV, M.D.    Assistants: None    Specimen: None    Prosthetics, Devices, Grafts: None    ANESTHESIA:  IV sedation.    FINDINGS:  The patient has a right renal stone easily seen with the lithotripter system and treated with shockwave therapy.  The stone was observed to become fuzzy during the treatment. Plan is for ESWL of remaining fragments in the kidney and ureteroscopy with laser lithotripsy of ureteral fragments.    PROCEDURE IN DETAIL:  After proper consents were obtained, the patient brought to the Operating Room where she was prepped and draped in normal fashion and provided with IV sedation on the [employer] lithotripsy table.  After alignment of the system on the aforementioned stone treatment was performed consisting of 3000 shocks varying in intensity from 16 to 26 kilovolts at a rate of 2 per second.  The stone was observed to fuzz out during the case and is expected to pass; however, there may be a need for further management if it does not.  The patient tolerated this well and there were no complications; she was then awakened and transferred to Recovery in stable condition.    BLOOD LOSS:  None.    BLOOD GIVEN:  None.    URINE OUTPUT:  None.    DRAINS:  None.    DISCHARGE DISPOSITION:  Home.    FOLLOWUP PLAN:  Pain medicines, stool softeners and instructions for care at home.  The patient will strain her urine and bring any fragments for evaluation, and she will return to clinic in a few weeks with KUB. Plan for ESWL of retained renal fragments and ureteroscopy with laser lithotripsy of ureteral fragments.     Where Is Your Acne Located?: Face Additional Comments (Use Complete Sentences): Patient is currently using dove moisturizer and st Moiz charcoal face scrub

## (undated) DEVICE — POSITIONER HEAD DONUT 9IN FOAM